# Patient Record
Sex: MALE | Race: BLACK OR AFRICAN AMERICAN | NOT HISPANIC OR LATINO | Employment: OTHER | URBAN - METROPOLITAN AREA
[De-identification: names, ages, dates, MRNs, and addresses within clinical notes are randomized per-mention and may not be internally consistent; named-entity substitution may affect disease eponyms.]

---

## 2021-11-01 ENCOUNTER — OFFICE VISIT (OUTPATIENT)
Dept: FAMILY MEDICINE | Facility: CLINIC | Age: 66
End: 2021-11-01
Payer: COMMERCIAL

## 2021-11-01 ENCOUNTER — TELEPHONE (OUTPATIENT)
Dept: FAMILY MEDICINE | Facility: CLINIC | Age: 66
End: 2021-11-01

## 2021-11-01 VITALS
OXYGEN SATURATION: 100 % | SYSTOLIC BLOOD PRESSURE: 118 MMHG | HEART RATE: 78 BPM | HEIGHT: 69 IN | BODY MASS INDEX: 21.92 KG/M2 | DIASTOLIC BLOOD PRESSURE: 76 MMHG | TEMPERATURE: 98 F | WEIGHT: 148 LBS

## 2021-11-01 DIAGNOSIS — N40.0 BENIGN PROSTATIC HYPERPLASIA WITHOUT LOWER URINARY TRACT SYMPTOMS: ICD-10-CM

## 2021-11-01 DIAGNOSIS — I10 ESSENTIAL HYPERTENSION: ICD-10-CM

## 2021-11-01 DIAGNOSIS — Z76.89 ESTABLISHING CARE WITH NEW DOCTOR, ENCOUNTER FOR: Primary | ICD-10-CM

## 2021-11-01 DIAGNOSIS — Z12.11 SPECIAL SCREENING FOR MALIGNANT NEOPLASMS, COLON: ICD-10-CM

## 2021-11-01 DIAGNOSIS — M79.89 MASS OF SOFT TISSUE OF NECK: ICD-10-CM

## 2021-11-01 PROCEDURE — 1101F PR PT FALLS ASSESS DOC 0-1 FALLS W/OUT INJ PAST YR: ICD-10-PCS | Mod: S$GLB,,, | Performed by: PHYSICIAN ASSISTANT

## 2021-11-01 PROCEDURE — 3078F PR MOST RECENT DIASTOLIC BLOOD PRESSURE < 80 MM HG: ICD-10-PCS | Mod: S$GLB,,, | Performed by: PHYSICIAN ASSISTANT

## 2021-11-01 PROCEDURE — 3288F FALL RISK ASSESSMENT DOCD: CPT | Mod: S$GLB,,, | Performed by: PHYSICIAN ASSISTANT

## 2021-11-01 PROCEDURE — 99387 PR PREVENTIVE VISIT,NEW,65 & OVER: ICD-10-PCS | Mod: S$GLB,,, | Performed by: PHYSICIAN ASSISTANT

## 2021-11-01 PROCEDURE — 4010F PR ACE/ARB THEARPY RXD/TAKEN: ICD-10-PCS | Mod: S$GLB,,, | Performed by: PHYSICIAN ASSISTANT

## 2021-11-01 PROCEDURE — 1160F PR REVIEW ALL MEDS BY PRESCRIBER/CLIN PHARMACIST DOCUMENTED: ICD-10-PCS | Mod: S$GLB,,, | Performed by: PHYSICIAN ASSISTANT

## 2021-11-01 PROCEDURE — 3008F PR BODY MASS INDEX (BMI) DOCUMENTED: ICD-10-PCS | Mod: S$GLB,,, | Performed by: PHYSICIAN ASSISTANT

## 2021-11-01 PROCEDURE — 1159F MED LIST DOCD IN RCRD: CPT | Mod: S$GLB,,, | Performed by: PHYSICIAN ASSISTANT

## 2021-11-01 PROCEDURE — 1160F RVW MEDS BY RX/DR IN RCRD: CPT | Mod: S$GLB,,, | Performed by: PHYSICIAN ASSISTANT

## 2021-11-01 PROCEDURE — 3288F PR FALLS RISK ASSESSMENT DOCUMENTED: ICD-10-PCS | Mod: S$GLB,,, | Performed by: PHYSICIAN ASSISTANT

## 2021-11-01 PROCEDURE — 3008F BODY MASS INDEX DOCD: CPT | Mod: S$GLB,,, | Performed by: PHYSICIAN ASSISTANT

## 2021-11-01 PROCEDURE — 3074F PR MOST RECENT SYSTOLIC BLOOD PRESSURE < 130 MM HG: ICD-10-PCS | Mod: S$GLB,,, | Performed by: PHYSICIAN ASSISTANT

## 2021-11-01 PROCEDURE — 4010F ACE/ARB THERAPY RXD/TAKEN: CPT | Mod: S$GLB,,, | Performed by: PHYSICIAN ASSISTANT

## 2021-11-01 PROCEDURE — 99387 INIT PM E/M NEW PAT 65+ YRS: CPT | Mod: S$GLB,,, | Performed by: PHYSICIAN ASSISTANT

## 2021-11-01 PROCEDURE — 3074F SYST BP LT 130 MM HG: CPT | Mod: S$GLB,,, | Performed by: PHYSICIAN ASSISTANT

## 2021-11-01 PROCEDURE — 1101F PT FALLS ASSESS-DOCD LE1/YR: CPT | Mod: S$GLB,,, | Performed by: PHYSICIAN ASSISTANT

## 2021-11-01 PROCEDURE — 1159F PR MEDICATION LIST DOCUMENTED IN MEDICAL RECORD: ICD-10-PCS | Mod: S$GLB,,, | Performed by: PHYSICIAN ASSISTANT

## 2021-11-01 PROCEDURE — 3078F DIAST BP <80 MM HG: CPT | Mod: S$GLB,,, | Performed by: PHYSICIAN ASSISTANT

## 2021-11-01 RX ORDER — AMLODIPINE AND BENAZEPRIL HYDROCHLORIDE 10; 40 MG/1; MG/1
1 CAPSULE ORAL DAILY
COMMUNITY
Start: 2021-08-09 | End: 2023-07-24 | Stop reason: SDUPTHER

## 2021-11-01 RX ORDER — TAMSULOSIN HYDROCHLORIDE 0.4 MG/1
0.4 CAPSULE ORAL 2 TIMES DAILY
COMMUNITY
Start: 2021-10-27

## 2021-11-02 ENCOUNTER — HOSPITAL ENCOUNTER (OUTPATIENT)
Dept: RADIOLOGY | Facility: HOSPITAL | Age: 66
Discharge: HOME | End: 2021-11-02
Attending: PHYSICIAN ASSISTANT
Payer: COMMERCIAL

## 2021-11-02 ENCOUNTER — TELEPHONE (OUTPATIENT)
Dept: FAMILY MEDICINE | Facility: CLINIC | Age: 66
End: 2021-11-02
Payer: COMMERCIAL

## 2021-11-02 DIAGNOSIS — M79.89 MASS OF SOFT TISSUE OF NECK: ICD-10-CM

## 2021-11-02 PROCEDURE — 70360 X-RAY EXAM OF NECK: CPT | Mod: TC,PO

## 2021-11-02 PROCEDURE — 76536 US EXAM OF HEAD AND NECK: CPT | Mod: TC,PO

## 2021-11-05 ENCOUNTER — TELEPHONE (OUTPATIENT)
Dept: FAMILY MEDICINE | Facility: CLINIC | Age: 66
End: 2021-11-05
Payer: COMMERCIAL

## 2021-11-05 LAB
ALBUMIN SERPL-MCNC: 4 G/DL (ref 3.6–5.1)
ALBUMIN/CREAT UR: 274 MCG/MG CREAT
ALBUMIN/GLOB SERPL: 1.3 (CALC) (ref 1–2.5)
ALP SERPL-CCNC: 56 U/L (ref 35–144)
ALT SERPL-CCNC: 10 U/L (ref 9–46)
AST SERPL-CCNC: 17 U/L (ref 10–35)
BASOPHILS # BLD AUTO: 171 CELLS/UL (ref 0–200)
BASOPHILS NFR BLD AUTO: 1.6 %
BILIRUB SERPL-MCNC: 0.3 MG/DL (ref 0.2–1.2)
BUN SERPL-MCNC: 18 MG/DL (ref 7–25)
BUN/CREAT SERPL: 13 (CALC) (ref 6–22)
CALCIUM SERPL-MCNC: 9.6 MG/DL (ref 8.6–10.3)
CHLORIDE SERPL-SCNC: 105 MMOL/L (ref 98–110)
CHOLEST SERPL-MCNC: 224 MG/DL
CHOLEST/HDLC SERPL: 2.6 (CALC)
CO2 SERPL-SCNC: 24 MMOL/L (ref 20–32)
CREAT SERPL-MCNC: 1.42 MG/DL (ref 0.7–1.25)
CREAT UR-MCNC: 69 MG/DL (ref 20–320)
EOSINOPHIL # BLD AUTO: 995 CELLS/UL (ref 15–500)
EOSINOPHIL NFR BLD AUTO: 9.3 %
ERYTHROCYTE [DISTWIDTH] IN BLOOD BY AUTOMATED COUNT: 14.7 % (ref 11–15)
GLOBULIN SER CALC-MCNC: 3.1 G/DL (CALC) (ref 1.9–3.7)
GLUCOSE SERPL-MCNC: 97 MG/DL (ref 65–99)
HBA1C MFR BLD: 5.5 % OF TOTAL HGB
HCT VFR BLD AUTO: 38.8 % (ref 38.5–50)
HDLC SERPL-MCNC: 86 MG/DL
HGB BLD-MCNC: 13 G/DL (ref 13.2–17.1)
LDLC SERPL CALC-MCNC: 121 MG/DL (CALC)
LYMPHOCYTES # BLD AUTO: 3595 CELLS/UL (ref 850–3900)
LYMPHOCYTES NFR BLD AUTO: 33.6 %
MCH RBC QN AUTO: 30.4 PG (ref 27–33)
MCHC RBC AUTO-ENTMCNC: 33.5 G/DL (ref 32–36)
MCV RBC AUTO: 90.9 FL (ref 80–100)
MICROALBUMIN UR-MCNC: 18.9 MG/DL
MONOCYTES # BLD AUTO: 963 CELLS/UL (ref 200–950)
MONOCYTES NFR BLD AUTO: 9 %
NEUTROPHILS # BLD AUTO: 4976 CELLS/UL (ref 1500–7800)
NEUTROPHILS NFR BLD AUTO: 46.5 %
NONHDLC SERPL-MCNC: 138 MG/DL (CALC)
PLATELET # BLD AUTO: 271 THOUSAND/UL (ref 140–400)
PMV BLD REES-ECKER: 11.6 FL (ref 7.5–12.5)
POTASSIUM SERPL-SCNC: 4.5 MMOL/L (ref 3.5–5.3)
PROT SERPL-MCNC: 7.1 G/DL (ref 6.1–8.1)
PSA SERPL-MCNC: 0.98 NG/ML
RBC # BLD AUTO: 4.27 MILLION/UL (ref 4.2–5.8)
SODIUM SERPL-SCNC: 141 MMOL/L (ref 135–146)
TRIGL SERPL-MCNC: 74 MG/DL
TSH SERPL-ACNC: 1.41 MIU/L (ref 0.4–4.5)
WBC # BLD AUTO: 10.7 THOUSAND/UL (ref 3.8–10.8)

## 2022-01-25 ENCOUNTER — OFFICE VISIT (OUTPATIENT)
Dept: DERMATOLOGY | Facility: CLINIC | Age: 67
End: 2022-01-25
Payer: COMMERCIAL

## 2022-01-25 DIAGNOSIS — L72.9 CYST OF SKIN: Primary | ICD-10-CM

## 2022-01-25 DIAGNOSIS — L90.5 SCAR: ICD-10-CM

## 2022-01-25 PROCEDURE — 1160F RVW MEDS BY RX/DR IN RCRD: CPT | Mod: CPTII,S$GLB,, | Performed by: STUDENT IN AN ORGANIZED HEALTH CARE EDUCATION/TRAINING PROGRAM

## 2022-01-25 PROCEDURE — 99999 PR PBB SHADOW E&M-EST. PATIENT-LVL III: CPT | Mod: PBBFAC,,, | Performed by: STUDENT IN AN ORGANIZED HEALTH CARE EDUCATION/TRAINING PROGRAM

## 2022-01-25 PROCEDURE — 1160F PR REVIEW ALL MEDS BY PRESCRIBER/CLIN PHARMACIST DOCUMENTED: ICD-10-PCS | Mod: CPTII,S$GLB,, | Performed by: STUDENT IN AN ORGANIZED HEALTH CARE EDUCATION/TRAINING PROGRAM

## 2022-01-25 PROCEDURE — 1126F PR PAIN SEVERITY QUANTIFIED, NO PAIN PRESENT: ICD-10-PCS | Mod: CPTII,S$GLB,, | Performed by: STUDENT IN AN ORGANIZED HEALTH CARE EDUCATION/TRAINING PROGRAM

## 2022-01-25 PROCEDURE — 1101F PT FALLS ASSESS-DOCD LE1/YR: CPT | Mod: CPTII,S$GLB,, | Performed by: STUDENT IN AN ORGANIZED HEALTH CARE EDUCATION/TRAINING PROGRAM

## 2022-01-25 PROCEDURE — 3288F PR FALLS RISK ASSESSMENT DOCUMENTED: ICD-10-PCS | Mod: CPTII,S$GLB,, | Performed by: STUDENT IN AN ORGANIZED HEALTH CARE EDUCATION/TRAINING PROGRAM

## 2022-01-25 PROCEDURE — 1159F MED LIST DOCD IN RCRD: CPT | Mod: CPTII,S$GLB,, | Performed by: STUDENT IN AN ORGANIZED HEALTH CARE EDUCATION/TRAINING PROGRAM

## 2022-01-25 PROCEDURE — 99999 PR PBB SHADOW E&M-EST. PATIENT-LVL III: ICD-10-PCS | Mod: PBBFAC,,, | Performed by: STUDENT IN AN ORGANIZED HEALTH CARE EDUCATION/TRAINING PROGRAM

## 2022-01-25 PROCEDURE — 1159F PR MEDICATION LIST DOCUMENTED IN MEDICAL RECORD: ICD-10-PCS | Mod: CPTII,S$GLB,, | Performed by: STUDENT IN AN ORGANIZED HEALTH CARE EDUCATION/TRAINING PROGRAM

## 2022-01-25 PROCEDURE — 3288F FALL RISK ASSESSMENT DOCD: CPT | Mod: CPTII,S$GLB,, | Performed by: STUDENT IN AN ORGANIZED HEALTH CARE EDUCATION/TRAINING PROGRAM

## 2022-01-25 PROCEDURE — 1101F PR PT FALLS ASSESS DOC 0-1 FALLS W/OUT INJ PAST YR: ICD-10-PCS | Mod: CPTII,S$GLB,, | Performed by: STUDENT IN AN ORGANIZED HEALTH CARE EDUCATION/TRAINING PROGRAM

## 2022-01-25 PROCEDURE — 1126F AMNT PAIN NOTED NONE PRSNT: CPT | Mod: CPTII,S$GLB,, | Performed by: STUDENT IN AN ORGANIZED HEALTH CARE EDUCATION/TRAINING PROGRAM

## 2022-01-25 PROCEDURE — 99202 PR OFFICE/OUTPT VISIT, NEW, LEVL II, 15-29 MIN: ICD-10-PCS | Mod: S$GLB,,, | Performed by: STUDENT IN AN ORGANIZED HEALTH CARE EDUCATION/TRAINING PROGRAM

## 2022-01-25 PROCEDURE — 99202 OFFICE O/P NEW SF 15 MIN: CPT | Mod: S$GLB,,, | Performed by: STUDENT IN AN ORGANIZED HEALTH CARE EDUCATION/TRAINING PROGRAM

## 2022-01-25 NOTE — PROGRESS NOTES
Subjective:       Patient ID:  Koffi Merritt is a 66 y.o. male who presents for   Chief Complaint   Patient presents with    Cyst     Neck, left chest     New patient    Patient here today for cysts on back of the neck and left chest  Present since November  Patient states they are not painful but there is some drainage  Patient drained cyst on back of the neck, used apple cider vinegar to start the draining then squeezed the rest out      Review of Systems   Constitutional: Negative for fever, chills and fatigue.   Respiratory: Negative for cough and shortness of breath.    Gastrointestinal: Negative for nausea and vomiting.   Hematologic/Lymphatic: Does not bruise/bleed easily.        Objective:    Physical Exam   Constitutional: He appears well-developed and well-nourished.   Neurological: He is alert and oriented to person, place, and time.   Psychiatric: He has a normal mood and affect.   Skin:   Areas Examined (abnormalities noted in diagram):   Chest / Axilla Inspection Performed              Diagram Legend     Erythematous scaling macule/papule c/w actinic keratosis       Vascular papule c/w angioma      Pigmented verrucoid papule/plaque c/w seborrheic keratosis      Yellow umbilicated papule c/w sebaceous hyperplasia      Irregularly shaped tan macule c/w lentigo     1-2 mm smooth white papules consistent with Milia      Movable subcutaneous cyst with punctum c/w epidermal inclusion cyst      Subcutaneous movable cyst c/w pilar cyst      Firm pink to brown papule c/w dermatofibroma      Pedunculated fleshy papule(s) c/w skin tag(s)      Evenly pigmented macule c/w junctional nevus     Mildly variegated pigmented, slightly irregular-bordered macule c/w mildly atypical nevus      Flesh colored to evenly pigmented papule c/w intradermal nevus       Pink pearly papule/plaque c/w basal cell carcinoma      Erythematous hyperkeratotic cursted plaque c/w SCC      Surgical scar with no sign of skin cancer recurrence       Open and closed comedones      Inflammatory papules and pustules      Verrucoid papule consistent consistent with wart     Erythematous eczematous patches and plaques     Dystrophic onycholytic nail with subungual debris c/w onychomycosis     Umbilicated papule    Erythematous-base heme-crusted tan verrucoid plaque consistent with inflamed seborrheic keratosis     Erythematous Silvery Scaling Plaque c/w Psoriasis     See annotation      Assessment / Plan:        Cyst of skin  X 2 on chest  -Reassurance given to patient. No treatment is necessary.   Discussed treatment options - excision vs observation. Cysts may recur with excision. Pt will defer treatment at this time.       Scar  - secondary to ruptured cyst, discussed risk of recurrence although appears well healed.            No follow-ups on file.

## 2022-05-02 ENCOUNTER — OFFICE VISIT (OUTPATIENT)
Dept: FAMILY MEDICINE | Facility: CLINIC | Age: 67
End: 2022-05-02
Payer: COMMERCIAL

## 2022-05-02 VITALS
WEIGHT: 149.31 LBS | HEIGHT: 69 IN | OXYGEN SATURATION: 97 % | DIASTOLIC BLOOD PRESSURE: 72 MMHG | HEART RATE: 86 BPM | TEMPERATURE: 99 F | SYSTOLIC BLOOD PRESSURE: 136 MMHG | BODY MASS INDEX: 22.12 KG/M2

## 2022-05-02 DIAGNOSIS — N52.9 ERECTILE DYSFUNCTION, UNSPECIFIED ERECTILE DYSFUNCTION TYPE: ICD-10-CM

## 2022-05-02 DIAGNOSIS — Z12.11 SPECIAL SCREENING FOR MALIGNANT NEOPLASMS, COLON: ICD-10-CM

## 2022-05-02 DIAGNOSIS — Z13.6 SCREENING FOR AAA (ABDOMINAL AORTIC ANEURYSM): ICD-10-CM

## 2022-05-02 DIAGNOSIS — E78.2 MIXED HYPERLIPIDEMIA: ICD-10-CM

## 2022-05-02 DIAGNOSIS — I10 ESSENTIAL HYPERTENSION: Primary | ICD-10-CM

## 2022-05-02 PROCEDURE — 1101F PR PT FALLS ASSESS DOC 0-1 FALLS W/OUT INJ PAST YR: ICD-10-PCS | Mod: S$GLB,,, | Performed by: PHYSICIAN ASSISTANT

## 2022-05-02 PROCEDURE — 3078F PR MOST RECENT DIASTOLIC BLOOD PRESSURE < 80 MM HG: ICD-10-PCS | Mod: S$GLB,,, | Performed by: PHYSICIAN ASSISTANT

## 2022-05-02 PROCEDURE — 3075F PR MOST RECENT SYSTOLIC BLOOD PRESS GE 130-139MM HG: ICD-10-PCS | Mod: S$GLB,,, | Performed by: PHYSICIAN ASSISTANT

## 2022-05-02 PROCEDURE — 4010F ACE/ARB THERAPY RXD/TAKEN: CPT | Mod: S$GLB,,, | Performed by: PHYSICIAN ASSISTANT

## 2022-05-02 PROCEDURE — 99214 OFFICE O/P EST MOD 30 MIN: CPT | Mod: S$GLB,,, | Performed by: PHYSICIAN ASSISTANT

## 2022-05-02 PROCEDURE — 4010F PR ACE/ARB THEARPY RXD/TAKEN: ICD-10-PCS | Mod: S$GLB,,, | Performed by: PHYSICIAN ASSISTANT

## 2022-05-02 PROCEDURE — 3008F PR BODY MASS INDEX (BMI) DOCUMENTED: ICD-10-PCS | Mod: S$GLB,,, | Performed by: PHYSICIAN ASSISTANT

## 2022-05-02 PROCEDURE — 3008F BODY MASS INDEX DOCD: CPT | Mod: S$GLB,,, | Performed by: PHYSICIAN ASSISTANT

## 2022-05-02 PROCEDURE — 3078F DIAST BP <80 MM HG: CPT | Mod: S$GLB,,, | Performed by: PHYSICIAN ASSISTANT

## 2022-05-02 PROCEDURE — 3288F FALL RISK ASSESSMENT DOCD: CPT | Mod: S$GLB,,, | Performed by: PHYSICIAN ASSISTANT

## 2022-05-02 PROCEDURE — 3075F SYST BP GE 130 - 139MM HG: CPT | Mod: S$GLB,,, | Performed by: PHYSICIAN ASSISTANT

## 2022-05-02 PROCEDURE — 3288F PR FALLS RISK ASSESSMENT DOCUMENTED: ICD-10-PCS | Mod: S$GLB,,, | Performed by: PHYSICIAN ASSISTANT

## 2022-05-02 PROCEDURE — 1159F MED LIST DOCD IN RCRD: CPT | Mod: S$GLB,,, | Performed by: PHYSICIAN ASSISTANT

## 2022-05-02 PROCEDURE — 1159F PR MEDICATION LIST DOCUMENTED IN MEDICAL RECORD: ICD-10-PCS | Mod: S$GLB,,, | Performed by: PHYSICIAN ASSISTANT

## 2022-05-02 PROCEDURE — 1160F RVW MEDS BY RX/DR IN RCRD: CPT | Mod: S$GLB,,, | Performed by: PHYSICIAN ASSISTANT

## 2022-05-02 PROCEDURE — 99214 PR OFFICE/OUTPT VISIT, EST, LEVL IV, 30-39 MIN: ICD-10-PCS | Mod: S$GLB,,, | Performed by: PHYSICIAN ASSISTANT

## 2022-05-02 PROCEDURE — 1101F PT FALLS ASSESS-DOCD LE1/YR: CPT | Mod: S$GLB,,, | Performed by: PHYSICIAN ASSISTANT

## 2022-05-02 PROCEDURE — 1160F PR REVIEW ALL MEDS BY PRESCRIBER/CLIN PHARMACIST DOCUMENTED: ICD-10-PCS | Mod: S$GLB,,, | Performed by: PHYSICIAN ASSISTANT

## 2022-05-02 RX ORDER — SILDENAFIL 25 MG/1
25 TABLET, FILM COATED ORAL DAILY PRN
Qty: 30 TABLET | Refills: 2 | Status: SHIPPED | OUTPATIENT
Start: 2022-05-02 | End: 2022-11-02

## 2022-05-02 RX ORDER — ATORVASTATIN CALCIUM 10 MG/1
10 TABLET, FILM COATED ORAL DAILY
Qty: 90 TABLET | Refills: 1 | Status: SHIPPED | OUTPATIENT
Start: 2022-05-02 | End: 2022-11-02 | Stop reason: SDUPTHER

## 2022-05-02 NOTE — PROGRESS NOTES
SUBJECTIVE:    Patient ID: Koffi Merritt is a 67 y.o. male.    Chief Complaint: Hypertension (No bottles.//Pt is here for a blood pressure check up.//Pt states colonoscopy done with Dr. Rivas this year. Will get note from colonoscopy.//ELVA)    Pt is a 67-year-old male w/ a PMHx of HTN (amlodipine-benazepril 10-40mg) and BPH (Flomax 0.4mg) who presents today for a 6 month follow up. Overall, he reports felling well today. His remains compliant with his BP regimen and is BP at home is well controlled, averaging 120-130's/60-70's mmHg.  He follows a low-sodium diet, averaging 3 meals/day which consists mostly of lean meats like fish and chicken.  He avoids red meat.  As for exercise, he walks for 20-30 minutes 2-3x/week.  He denies experiencing any CP, palpitations, SOB, or syncopal episodes.    Today, he reports having difficulty obtaining an erection and is requesting Viagra.    He is a current everyday smoker - smoking a 1/2 ppd for > 21 years.  He is not interested in quitting at this time, but is not on statin therapy.  He has never had a AAA ultrasound completed.    On last visit, he was referred to Dr. Rivas and reports having a C-scope completed. This will be requested today.     He has received 3 doses of the Pfizer COVID-19 vaccine and is going to receive the booster in the future.    He has not received the pneumococcal or shingles vaccine at this time.      No visits with results within 6 Month(s) from this visit.   Latest known visit with results is:   Office Visit on 11/01/2021   Component Date Value Ref Range Status    WBC 11/04/2021 10.7  3.8 - 10.8 Thousand/uL Final    RBC 11/04/2021 4.27  4.20 - 5.80 Million/uL Final    Hemoglobin 11/04/2021 13.0 (A) 13.2 - 17.1 g/dL Final    Hematocrit 11/04/2021 38.8  38.5 - 50.0 % Final    MCV 11/04/2021 90.9  80.0 - 100.0 fL Final    MCH 11/04/2021 30.4  27.0 - 33.0 pg Final    MCHC 11/04/2021 33.5  32.0 - 36.0 g/dL Final    RDW 11/04/2021 14.7  11.0 -  15.0 % Final    Platelets 11/04/2021 271  140 - 400 Thousand/uL Final    MPV 11/04/2021 11.6  7.5 - 12.5 fL Final    Neutrophils, Abs 11/04/2021 4,976  1,500 - 7,800 cells/uL Final    Lymph # 11/04/2021 3,595  850 - 3,900 cells/uL Final    Mono # 11/04/2021 963 (A) 200 - 950 cells/uL Final    Eos # 11/04/2021 995 (A) 15 - 500 cells/uL Final    Baso # 11/04/2021 171  0 - 200 cells/uL Final    Neutrophils Relative 11/04/2021 46.5  % Final    Lymph % 11/04/2021 33.6  % Final    Mono % 11/04/2021 9.0  % Final    Eosinophil % 11/04/2021 9.3  % Final    Basophil % 11/04/2021 1.6  % Final    Glucose 11/04/2021 97  65 - 99 mg/dL Final    BUN 11/04/2021 18  7 - 25 mg/dL Final    Creatinine 11/04/2021 1.42 (A) 0.70 - 1.25 mg/dL Final    eGFR if non African American 11/04/2021 50 (A) > OR = 60 mL/min/1.73m2 Final    eGFR if  11/04/2021 58 (A) > OR = 60 mL/min/1.73m2 Final    BUN/Creatinine Ratio 11/04/2021 13  6 - 22 (calc) Final    Sodium 11/04/2021 141  135 - 146 mmol/L Final    Potassium 11/04/2021 4.5  3.5 - 5.3 mmol/L Final    Chloride 11/04/2021 105  98 - 110 mmol/L Final    CO2 11/04/2021 24  20 - 32 mmol/L Final    Calcium 11/04/2021 9.6  8.6 - 10.3 mg/dL Final    Total Protein 11/04/2021 7.1  6.1 - 8.1 g/dL Final    Albumin 11/04/2021 4.0  3.6 - 5.1 g/dL Final    Globulin, Total 11/04/2021 3.1  1.9 - 3.7 g/dL (calc) Final    Albumin/Globulin Ratio 11/04/2021 1.3  1.0 - 2.5 (calc) Final    Total Bilirubin 11/04/2021 0.3  0.2 - 1.2 mg/dL Final    Alkaline Phosphatase 11/04/2021 56  35 - 144 U/L Final    AST 11/04/2021 17  10 - 35 U/L Final    ALT 11/04/2021 10  9 - 46 U/L Final    Hemoglobin A1C 11/04/2021 5.5  <5.7 % of total Hgb Final    Creatinine, Urine 11/04/2021 69  20 - 320 mg/dL Final    Microalb, Ur 11/04/2021 18.9  See Note: mg/dL Final    Microalb/Creat Ratio 11/04/2021 274 (A) <30 mcg/mg creat Final    TSH w/reflex to FT4 11/04/2021 1.41  0.40 - 4.50 mIU/L  Final    PROSTATE SPECIFIC ANTIGEN, SCR - Q* 2021 0.98  < OR = 4.00 ng/mL Final    Cholesterol 2021 224 (A) <200 mg/dL Final    HDL 2021 86  > OR = 40 mg/dL Final    Triglycerides 2021 74  <150 mg/dL Final    LDL Cholesterol 2021 121 (A) mg/dL (calc) Final    HDL/Cholesterol Ratio 2021 2.6  <5.0 (calc) Final    Non HDL Chol. (LDL+VLDL) 2021 138 (A) <130 mg/dL (calc) Final       Past Medical History:   Diagnosis Date    Hypertension      History reviewed. No pertinent surgical history.  Family History   Problem Relation Age of Onset    Diabetes Mother     Kidney failure Mother          at 82    Heart failure Father          in the late 50's.     Coronary artery disease Brother         stent placed, DM II, alive at 70       Marital Status:   Alcohol History:  reports current alcohol use of about 3.0 standard drinks of alcohol per week.  Tobacco History:  reports that he has been smoking cigarettes. He has been smoking about 0.50 packs per day. He has never used smokeless tobacco.  Drug History:  has no history on file for drug use.    Health Maintenance Topics with due status: Not Due       Topic Last Completion Date    Lipid Panel 2021    Influenza Vaccine Not Due     Immunization History   Administered Date(s) Administered    COVID-19, MRNA, LN-S, PF (Pfizer) (Purple Cap) 2021, 2021, 2021       Review of patient's allergies indicates:  No Known Allergies    Current Outpatient Medications:     amLODIPine-benazepriL (LOTREL) 10-40 mg per capsule, Take 1 capsule by mouth once daily., Disp: , Rfl:     atorvastatin (LIPITOR) 10 MG tablet, Take 1 tablet (10 mg total) by mouth once daily., Disp: 90 tablet, Rfl: 1    sildenafiL (VIAGRA) 25 MG tablet, Take 1 tablet (25 mg total) by mouth daily as needed for Erectile Dysfunction., Disp: 30 tablet, Rfl: 2    tamsulosin (FLOMAX) 0.4 mg Cap, Take 0.4 mg by mouth 2 (two) times a  "day., Disp: , Rfl:     Review of Systems   Constitutional: Negative for activity change, chills, fatigue and fever.   HENT: Negative for congestion and sore throat.    Respiratory: Negative for cough, shortness of breath and wheezing.    Cardiovascular: Negative for chest pain, palpitations and leg swelling.   Gastrointestinal: Negative for abdominal pain, constipation, diarrhea, nausea and vomiting.   Genitourinary: Negative for difficulty urinating, dysuria, frequency, hematuria and urgency.        "ED."   Musculoskeletal: Negative for arthralgias and myalgias.   Skin: Negative for rash.   Neurological: Negative for dizziness, syncope, weakness, light-headedness and headaches.   Psychiatric/Behavioral: Negative for behavioral problems.          Objective:      Vitals:    05/02/22 1510   BP: 136/72   Pulse: 86   Temp: 98.5 °F (36.9 °C)   SpO2: 97%   Weight: 67.7 kg (149 lb 5.1 oz)   Height: 5' 9" (1.753 m)     Physical Exam  Vitals and nursing note reviewed.   Constitutional:       General: He is not in acute distress.     Appearance: Normal appearance. He is normal weight. He is not ill-appearing, toxic-appearing or diaphoretic.   HENT:      Head: Normocephalic and atraumatic.      Right Ear: External ear normal.      Left Ear: External ear normal.      Nose: Nose normal. No rhinorrhea.      Mouth/Throat:      Mouth: Mucous membranes are moist.      Pharynx: Oropharynx is clear. No oropharyngeal exudate or posterior oropharyngeal erythema.   Eyes:      General: No scleral icterus.        Right eye: No discharge.         Left eye: No discharge.      Extraocular Movements: Extraocular movements intact.      Conjunctiva/sclera: Conjunctivae normal.   Neck:      Vascular: No carotid bruit.   Cardiovascular:      Rate and Rhythm: Normal rate and regular rhythm.      Pulses: Normal pulses.      Heart sounds: Normal heart sounds. No murmur heard.    No friction rub. No gallop.   Pulmonary:      Effort: Pulmonary effort " is normal. No respiratory distress.      Breath sounds: Normal breath sounds. No stridor. No wheezing, rhonchi or rales.   Chest:      Chest wall: No tenderness.   Abdominal:      General: There is no distension.      Palpations: Abdomen is soft. There is no mass.      Tenderness: There is no abdominal tenderness. There is no right CVA tenderness, left CVA tenderness, guarding or rebound.   Musculoskeletal:         General: No swelling or tenderness. Normal range of motion.      Cervical back: Normal range of motion. No rigidity. Tenderness: To deep palpation of the 4 cm mass on the posterior neck.  Mass is firm, fixed, and nonmobile.      Right lower leg: No edema.      Left lower leg: No edema.      Comments: 90 degree flexion at the waist.  5/5 strength appreciated of the bilateral upper and lower extremities against resistance.   Lymphadenopathy:      Cervical: No cervical adenopathy.   Skin:     General: Skin is warm and dry.      Findings: No lesion.   Neurological:      Mental Status: He is alert. Mental status is at baseline.      Cranial Nerves: No cranial nerve deficit.      Gait: Gait normal.   Psychiatric:         Mood and Affect: Mood normal.         Behavior: Behavior normal. Behavior is cooperative.           Assessment:       1. Essential hypertension    2. Mixed hyperlipidemia    3. Screening for AAA (abdominal aortic aneurysm)    4. Erectile dysfunction, unspecified erectile dysfunction type    5. Special screening for malignant neoplasms, colon           Plan:       Essential hypertension  Currently stable and well controlled.  Continue current medication regimen as is. No refills needed today.  -     CBC Auto Differential; Future; Expected date: 05/02/2022    Mixed hyperlipidemia  Tot Chol: 224, LDL: 121, HDL: 86, [place] risk score:  12.8%  Start Lipitor 10 mg q.d. and repeat CMP and lipid panel in 3 months.  -     Comprehensive Metabolic Panel; Future; Expected date: 05/02/2022  -     Lipid  Panel; Future; Expected date: 05/02/2022  -     atorvastatin (LIPITOR) 10 MG tablet; Take 1 tablet (10 mg total) by mouth once daily.  Dispense: 90 tablet; Refill: 1    Screening for AAA (abdominal aortic aneurysm)  AAA U/S ordered today   -      Abdominal Aorta; Future; Expected date: 05/02/2022    Erectile dysfunction, unspecified erectile dysfunction type  Start p.r.n. Viagra 25mg, 1/2 tablet   -     sildenafiL (VIAGRA) 25 MG tablet; Take 1 tablet (25 mg total) by mouth daily as needed for Erectile Dysfunction.  Dispense: 30 tablet; Refill: 2    Special screening for malignant neoplasms, colon  UTD. Screening colonoscopy report will be requested from Dr. Rivas today.    Patient was offered, but declined Prevnar 20 and Shingles vaccine today.    Follow up in about 6 months (around 11/2/2022) for HLP, HTN, With labs prior to visit.        5/2/2022 Joe Vinson PA-C

## 2022-05-12 ENCOUNTER — HOSPITAL ENCOUNTER (OUTPATIENT)
Dept: RADIOLOGY | Facility: HOSPITAL | Age: 67
Discharge: HOME | End: 2022-05-12
Attending: PHYSICIAN ASSISTANT
Payer: COMMERCIAL

## 2022-05-12 DIAGNOSIS — Z13.6 SCREENING FOR AAA (ABDOMINAL AORTIC ANEURYSM): ICD-10-CM

## 2022-05-12 PROCEDURE — 76706 US ABDL AORTA SCREEN AAA: CPT | Mod: TC,PO

## 2022-05-13 ENCOUNTER — TELEPHONE (OUTPATIENT)
Dept: FAMILY MEDICINE | Facility: CLINIC | Age: 67
End: 2022-05-13

## 2022-05-13 NOTE — TELEPHONE ENCOUNTER
----- Message from BRITTANY Giordano sent at 5/13/2022  6:55 AM CDT -----  Please contact patient and inform him that his abdominal ultrasound is NEGATIVE for any abdominal aortic aneurysms.

## 2022-08-04 ENCOUNTER — TELEPHONE (OUTPATIENT)
Dept: FAMILY MEDICINE | Facility: CLINIC | Age: 67
End: 2022-08-04

## 2022-08-06 ENCOUNTER — PATIENT MESSAGE (OUTPATIENT)
Dept: FAMILY MEDICINE | Facility: CLINIC | Age: 67
End: 2022-08-06

## 2022-11-02 ENCOUNTER — OFFICE VISIT (OUTPATIENT)
Dept: FAMILY MEDICINE | Facility: CLINIC | Age: 67
End: 2022-11-02
Payer: COMMERCIAL

## 2022-11-02 VITALS
DIASTOLIC BLOOD PRESSURE: 60 MMHG | HEART RATE: 76 BPM | SYSTOLIC BLOOD PRESSURE: 116 MMHG | TEMPERATURE: 98 F | BODY MASS INDEX: 21.62 KG/M2 | HEIGHT: 69 IN | WEIGHT: 146 LBS

## 2022-11-02 DIAGNOSIS — Z23 NEED FOR PNEUMOCOCCAL VACCINE: ICD-10-CM

## 2022-11-02 DIAGNOSIS — N52.9 ERECTILE DYSFUNCTION, UNSPECIFIED ERECTILE DYSFUNCTION TYPE: ICD-10-CM

## 2022-11-02 DIAGNOSIS — I10 ESSENTIAL HYPERTENSION: Primary | ICD-10-CM

## 2022-11-02 DIAGNOSIS — E78.2 MIXED HYPERLIPIDEMIA: ICD-10-CM

## 2022-11-02 DIAGNOSIS — Z23 NEED FOR INFLUENZA VACCINATION: ICD-10-CM

## 2022-11-02 DIAGNOSIS — F17.210 CIGARETTE SMOKER: ICD-10-CM

## 2022-11-02 DIAGNOSIS — Z12.5 PROSTATE CANCER SCREENING: ICD-10-CM

## 2022-11-02 DIAGNOSIS — M47.812 CERVICAL SPONDYLOSIS: ICD-10-CM

## 2022-11-02 PROCEDURE — 3008F BODY MASS INDEX DOCD: CPT | Mod: CPTII,S$GLB,, | Performed by: PHYSICIAN ASSISTANT

## 2022-11-02 PROCEDURE — 1159F PR MEDICATION LIST DOCUMENTED IN MEDICAL RECORD: ICD-10-PCS | Mod: CPTII,S$GLB,, | Performed by: PHYSICIAN ASSISTANT

## 2022-11-02 PROCEDURE — 3078F PR MOST RECENT DIASTOLIC BLOOD PRESSURE < 80 MM HG: ICD-10-PCS | Mod: CPTII,S$GLB,, | Performed by: PHYSICIAN ASSISTANT

## 2022-11-02 PROCEDURE — 1159F MED LIST DOCD IN RCRD: CPT | Mod: CPTII,S$GLB,, | Performed by: PHYSICIAN ASSISTANT

## 2022-11-02 PROCEDURE — 99214 OFFICE O/P EST MOD 30 MIN: CPT | Mod: S$GLB,,, | Performed by: PHYSICIAN ASSISTANT

## 2022-11-02 PROCEDURE — 3074F SYST BP LT 130 MM HG: CPT | Mod: CPTII,S$GLB,, | Performed by: PHYSICIAN ASSISTANT

## 2022-11-02 PROCEDURE — 1160F RVW MEDS BY RX/DR IN RCRD: CPT | Mod: CPTII,S$GLB,, | Performed by: PHYSICIAN ASSISTANT

## 2022-11-02 PROCEDURE — 99214 PR OFFICE/OUTPT VISIT, EST, LEVL IV, 30-39 MIN: ICD-10-PCS | Mod: S$GLB,,, | Performed by: PHYSICIAN ASSISTANT

## 2022-11-02 PROCEDURE — 1160F PR REVIEW ALL MEDS BY PRESCRIBER/CLIN PHARMACIST DOCUMENTED: ICD-10-PCS | Mod: CPTII,S$GLB,, | Performed by: PHYSICIAN ASSISTANT

## 2022-11-02 PROCEDURE — 4010F PR ACE/ARB THEARPY RXD/TAKEN: ICD-10-PCS | Mod: CPTII,S$GLB,, | Performed by: PHYSICIAN ASSISTANT

## 2022-11-02 PROCEDURE — 3074F PR MOST RECENT SYSTOLIC BLOOD PRESSURE < 130 MM HG: ICD-10-PCS | Mod: CPTII,S$GLB,, | Performed by: PHYSICIAN ASSISTANT

## 2022-11-02 PROCEDURE — 3008F PR BODY MASS INDEX (BMI) DOCUMENTED: ICD-10-PCS | Mod: CPTII,S$GLB,, | Performed by: PHYSICIAN ASSISTANT

## 2022-11-02 PROCEDURE — 4010F ACE/ARB THERAPY RXD/TAKEN: CPT | Mod: CPTII,S$GLB,, | Performed by: PHYSICIAN ASSISTANT

## 2022-11-02 PROCEDURE — 3078F DIAST BP <80 MM HG: CPT | Mod: CPTII,S$GLB,, | Performed by: PHYSICIAN ASSISTANT

## 2022-11-02 RX ORDER — ATORVASTATIN CALCIUM 10 MG/1
10 TABLET, FILM COATED ORAL DAILY
Qty: 90 TABLET | Refills: 1 | Status: SHIPPED | OUTPATIENT
Start: 2022-11-02 | End: 2023-05-03 | Stop reason: SDUPTHER

## 2022-11-02 NOTE — PROGRESS NOTES
SUBJECTIVE:    Patient ID: Koffi Merritt is a 67 y.o. male.    Chief Complaint: Hypertension (Brought bottles, Flu and PNA declined// SW)    Pt is a 67 y.o. male w/ a PMHx of HTN (Amlodipine-Benazepril 10-40mg) and BPH (Flomax 0.8mg) who presents today for a 6 month follow up.  Today, he reports left-sided cervical neck pain that started after sleeping in an uncomfortable position.  The pain is intermittently present and not limiting his ADLs.  He denies any radicular symptoms.  He has not taken any OTC measures at this time to alleviate the pain.    Regarding his history of HTN, he remains compliant with his antihypertensive regimen.  He does not check his BP at home, so no log provided today.  BP is stable and well controlled in office.  He denies any CP, palpitations, frequent headaches, dizziness, lightheadedness, or syncopal episodes.  As for his BPH, he is experiencing nocturia 2x/night and is compliant with his Flomax 0.8mg daily.  Last PSA (11/04/2021): 0.98.    He is a current everyday smoker - smoking a 1/2 ppd for > 21 years.  He is not interested in quitting at this time, despite counseling on multiple office visits.  UTD:  AAA ultrasound screening (05/12/2022) - negative.    UTD: Colonoscopy (01/11/2022) -  Dr. Rivas - RTC 5 years.    Due for influenza and pneumococcal vaccine.    Due for updated lab work.      No visits with results within 6 Month(s) from this visit.   Latest known visit with results is:   Office Visit on 11/01/2021   Component Date Value Ref Range Status    WBC 11/04/2021 10.7  3.8 - 10.8 Thousand/uL Final    RBC 11/04/2021 4.27  4.20 - 5.80 Million/uL Final    Hemoglobin 11/04/2021 13.0 (L)  13.2 - 17.1 g/dL Final    Hematocrit 11/04/2021 38.8  38.5 - 50.0 % Final    MCV 11/04/2021 90.9  80.0 - 100.0 fL Final    MCH 11/04/2021 30.4  27.0 - 33.0 pg Final    MCHC 11/04/2021 33.5  32.0 - 36.0 g/dL Final    RDW 11/04/2021 14.7  11.0 - 15.0 % Final    Platelets 11/04/2021 271  140 - 400  Thousand/uL Final    MPV 11/04/2021 11.6  7.5 - 12.5 fL Final    Neutrophils, Abs 11/04/2021 4,976  1,500 - 7,800 cells/uL Final    Lymph # 11/04/2021 3,595  850 - 3,900 cells/uL Final    Mono # 11/04/2021 963 (H)  200 - 950 cells/uL Final    Eos # 11/04/2021 995 (H)  15 - 500 cells/uL Final    Baso # 11/04/2021 171  0 - 200 cells/uL Final    Neutrophils Relative 11/04/2021 46.5  % Final    Lymph % 11/04/2021 33.6  % Final    Mono % 11/04/2021 9.0  % Final    Eosinophil % 11/04/2021 9.3  % Final    Basophil % 11/04/2021 1.6  % Final    Glucose 11/04/2021 97  65 - 99 mg/dL Final    BUN 11/04/2021 18  7 - 25 mg/dL Final    Creatinine 11/04/2021 1.42 (H)  0.70 - 1.25 mg/dL Final    eGFR if non African American 11/04/2021 50 (L)  > OR = 60 mL/min/1.73m2 Final    eGFR if African American 11/04/2021 58 (L)  > OR = 60 mL/min/1.73m2 Final    BUN/Creatinine Ratio 11/04/2021 13  6 - 22 (calc) Final    Sodium 11/04/2021 141  135 - 146 mmol/L Final    Potassium 11/04/2021 4.5  3.5 - 5.3 mmol/L Final    Chloride 11/04/2021 105  98 - 110 mmol/L Final    CO2 11/04/2021 24  20 - 32 mmol/L Final    Calcium 11/04/2021 9.6  8.6 - 10.3 mg/dL Final    Total Protein 11/04/2021 7.1  6.1 - 8.1 g/dL Final    Albumin 11/04/2021 4.0  3.6 - 5.1 g/dL Final    Globulin, Total 11/04/2021 3.1  1.9 - 3.7 g/dL (calc) Final    Albumin/Globulin Ratio 11/04/2021 1.3  1.0 - 2.5 (calc) Final    Total Bilirubin 11/04/2021 0.3  0.2 - 1.2 mg/dL Final    Alkaline Phosphatase 11/04/2021 56  35 - 144 U/L Final    AST 11/04/2021 17  10 - 35 U/L Final    ALT 11/04/2021 10  9 - 46 U/L Final    Hemoglobin A1C 11/04/2021 5.5  <5.7 % of total Hgb Final    Creatinine, Urine 11/04/2021 69  20 - 320 mg/dL Final    Microalb, Ur 11/04/2021 18.9  See Note: mg/dL Final    Microalb/Creat Ratio 11/04/2021 274 (H)  <30 mcg/mg creat Final    TSH w/reflex to FT4 11/04/2021 1.41  0.40 - 4.50 mIU/L Final    PROSTATE SPECIFIC ANTIGEN, SCR - Q* 11/04/2021 0.98  < OR = 4.00 ng/mL  Final    Cholesterol 2021 224 (H)  <200 mg/dL Final    HDL 2021 86  > OR = 40 mg/dL Final    Triglycerides 2021 74  <150 mg/dL Final    LDL Cholesterol 2021 121 (H)  mg/dL (calc) Final    HDL/Cholesterol Ratio 2021 2.6  <5.0 (calc) Final    Non HDL Chol. (LDL+VLDL) 2021 138 (H)  <130 mg/dL (calc) Final       Past Medical History:   Diagnosis Date    Hypertension      History reviewed. No pertinent surgical history.  Family History   Problem Relation Age of Onset    Diabetes Mother     Kidney failure Mother          at 82    Heart failure Father          in the late 50's.     Coronary artery disease Brother         stent placed, DM II, alive at 70       Marital Status:   Alcohol History:  reports current alcohol use of about 3.0 standard drinks per week.  Tobacco History:  reports that he has been smoking cigarettes. He has been smoking an average of .5 packs per day. He has never used smokeless tobacco.  Drug History:  has no history on file for drug use.    Health Maintenance Topics with due status: Not Due       Topic Last Completion Date    Lipid Panel 2021    Colorectal Cancer Screening 2022     Immunization History   Administered Date(s) Administered    COVID-19, MRNA, LN-S, PF (Pfizer) (Purple Cap) 2021, 2021, 2021       Review of patient's allergies indicates:  No Known Allergies    Current Outpatient Medications:     amLODIPine-benazepriL (LOTREL) 10-40 mg per capsule, Take 1 capsule by mouth once daily., Disp: , Rfl:     tamsulosin (FLOMAX) 0.4 mg Cap, Take 0.4 mg by mouth 2 (two) times a day., Disp: , Rfl:     atorvastatin (LIPITOR) 10 MG tablet, Take 1 tablet (10 mg total) by mouth once daily., Disp: 90 tablet, Rfl: 1    Review of Systems   Constitutional:  Negative for activity change, appetite change, chills, fatigue, fever and unexpected weight change.   HENT:  Negative for congestion, ear discharge, ear pain, postnasal  "drip, rhinorrhea, sore throat, trouble swallowing and voice change.    Respiratory:  Negative for cough, shortness of breath and wheezing.    Cardiovascular:  Negative for chest pain and palpitations.   Gastrointestinal:  Negative for abdominal pain, constipation, diarrhea, nausea and vomiting.   Genitourinary:  Negative for decreased urine volume, difficulty urinating, dysuria, frequency, hematuria and urgency.   Musculoskeletal:  Positive for neck stiffness. Negative for arthralgias, gait problem and myalgias.   Skin:  Negative for rash.   Neurological:  Negative for dizziness, syncope, weakness, light-headedness, numbness and headaches.   Psychiatric/Behavioral:  Negative for behavioral problems.         Objective:      Vitals:    11/02/22 1452   BP: 116/60   Pulse: 76   Temp: 98.1 °F (36.7 °C)   Weight: 66.2 kg (146 lb)   Height: 5' 9" (1.753 m)     Physical Exam  Vitals and nursing note reviewed.   Constitutional:       General: He is not in acute distress.     Appearance: Normal appearance. He is normal weight. He is not ill-appearing, toxic-appearing or diaphoretic.   HENT:      Head: Normocephalic and atraumatic.      Right Ear: External ear normal.      Left Ear: External ear normal.      Nose: Nose normal. No rhinorrhea.      Mouth/Throat:      Mouth: Mucous membranes are moist.      Pharynx: Oropharynx is clear. No posterior oropharyngeal erythema.   Eyes:      General: No scleral icterus.        Right eye: No discharge.         Left eye: No discharge.      Extraocular Movements: Extraocular movements intact.      Conjunctiva/sclera: Conjunctivae normal.   Neck:      Vascular: No carotid bruit.      Comments: Cervical spine has full ROM on flexion, extension, bilateral rotation, and bilateral lateral bend.  5/5 strength against resistance on flexion, extension, bilateral rotation, and bilateral lateral bend.  No masses or bony abnormalities noted upon palpation of the cervical spine.  No step-offs noted " upon palpation of the cervical spine.    Cardiovascular:      Rate and Rhythm: Normal rate and regular rhythm.      Pulses: Normal pulses.      Heart sounds: Normal heart sounds. No murmur heard.    No friction rub.   Pulmonary:      Effort: Pulmonary effort is normal. No respiratory distress.      Breath sounds: Normal breath sounds. No wheezing, rhonchi or rales.   Abdominal:      General: There is no distension.      Palpations: Abdomen is soft.      Tenderness: There is no abdominal tenderness. There is no guarding or rebound.   Musculoskeletal:         General: No swelling or tenderness. Normal range of motion.      Cervical back: Normal range of motion. No swelling, deformity, rigidity, spasms, tenderness or bony tenderness. No pain with movement. Normal range of motion.      Right lower leg: No edema.      Left lower leg: No edema.      Comments: 90 degree flexion at the waist.  5/5 strength appreciated of the bilateral upper and lower extremities against resistance.   Lymphadenopathy:      Cervical: No cervical adenopathy.   Skin:     General: Skin is warm and dry.      Coloration: Skin is not jaundiced.      Findings: No lesion or rash.   Neurological:      Mental Status: He is alert. Mental status is at baseline.      Cranial Nerves: No cranial nerve deficit.      Gait: Gait normal.   Psychiatric:         Mood and Affect: Mood normal.         Behavior: Behavior normal. Behavior is cooperative.         Assessment:       1. Essential hypertension    2. Mixed hyperlipidemia    3. Erectile dysfunction, unspecified erectile dysfunction type    4. Prostate cancer screening    5. Cervical spondylosis    6. Need for influenza vaccination    7. Need for pneumococcal vaccine    8. Cigarette smoker           Plan:       Essential hypertension  Stable and well controlled.   Stop smoking and follow a low-sodium diet.  Continue current antihypertensive treatment regimen as is - no refills requested today.  -     CBC  Auto Differential; Future; Expected date: 11/02/2022  -     Microalbumin/Creatinine Ratio, Urine; Future  -     TSH w/reflex to FT4; Future; Expected date: 11/02/2022  -     Urinalysis, Reflex to Urine Culture Urine, Clean Catch; Future; Expected date: 11/02/2022    Mixed hyperlipidemia  Due for updated lipid panel and CMP - ordered today and to be completed when patient is fasting.  Continue Lipitor 10 mg q.d.. - refill sent today  -     atorvastatin (LIPITOR) 10 MG tablet; Take 1 tablet (10 mg total) by mouth once daily.  Dispense: 90 tablet; Refill: 1  -     Comprehensive Metabolic Panel; Future; Expected date: 11/02/2022  -     Lipid Panel; Future; Expected date: 11/02/2022    Prostate cancer screening  -     PSA, Screening; Future; Expected date: 11/02/2022    Cervical spondylosis  Initiate RICE therapy.   Start OTC Voltaren 1% gel b.i.d. and p.r.n. OTC Tylenol extra-strength for pain relief.  Begin home neck stretching/strengthening exercises.  If pain persists, contact the office and will consider starting Mobic and referring for physical therapy.    Need for influenza vaccination  Offered influenza vaccine today, but patient declined.    Need for pneumococcal vaccine  Offered Prevnar 20 vaccine to patient today, but patient declined.    Cigarette smoker  Smoking cessation discussed with patient today in office, but he is not interested in quitting at this time.  Will discuss need for LDCT scan on follow up visit.     Annual lab work ordered today and to be completed when patient is fasting.    Follow up for HTN, HLP, With labs prior to visit.        11/2/2022 Joe Vinson PA-C

## 2023-01-10 ENCOUNTER — TELEPHONE (OUTPATIENT)
Dept: FAMILY MEDICINE | Facility: CLINIC | Age: 68
End: 2023-01-10

## 2023-01-10 NOTE — TELEPHONE ENCOUNTER
----- Message from Tameka Irby MA sent at 1/10/2023 11:39 AM CST -----    ----- Message -----  From: Malaika Ward  Sent: 1/10/2023  11:36 AM CST  To: [facility] Staff    Pt need a sooner appt for pain on the left side of his head and a terrible ear ache. [phone]

## 2023-01-11 ENCOUNTER — OFFICE VISIT (OUTPATIENT)
Dept: FAMILY MEDICINE | Facility: CLINIC | Age: 68
End: 2023-01-11
Payer: COMMERCIAL

## 2023-01-11 VITALS
HEART RATE: 87 BPM | SYSTOLIC BLOOD PRESSURE: 118 MMHG | TEMPERATURE: 98 F | OXYGEN SATURATION: 96 % | DIASTOLIC BLOOD PRESSURE: 64 MMHG

## 2023-01-11 DIAGNOSIS — I10 ESSENTIAL HYPERTENSION: ICD-10-CM

## 2023-01-11 DIAGNOSIS — R35.1 NOCTURIA: ICD-10-CM

## 2023-01-11 DIAGNOSIS — H66.90 ACUTE OTITIS MEDIA, UNSPECIFIED OTITIS MEDIA TYPE: Primary | ICD-10-CM

## 2023-01-11 DIAGNOSIS — E78.2 MIXED HYPERLIPIDEMIA: ICD-10-CM

## 2023-01-11 PROCEDURE — 3288F PR FALLS RISK ASSESSMENT DOCUMENTED: ICD-10-PCS | Mod: CPTII,S$GLB,, | Performed by: PHYSICIAN ASSISTANT

## 2023-01-11 PROCEDURE — 1160F RVW MEDS BY RX/DR IN RCRD: CPT | Mod: CPTII,S$GLB,, | Performed by: PHYSICIAN ASSISTANT

## 2023-01-11 PROCEDURE — 3074F PR MOST RECENT SYSTOLIC BLOOD PRESSURE < 130 MM HG: ICD-10-PCS | Mod: CPTII,S$GLB,, | Performed by: PHYSICIAN ASSISTANT

## 2023-01-11 PROCEDURE — 3078F DIAST BP <80 MM HG: CPT | Mod: CPTII,S$GLB,, | Performed by: PHYSICIAN ASSISTANT

## 2023-01-11 PROCEDURE — 3074F SYST BP LT 130 MM HG: CPT | Mod: CPTII,S$GLB,, | Performed by: PHYSICIAN ASSISTANT

## 2023-01-11 PROCEDURE — 1160F PR REVIEW ALL MEDS BY PRESCRIBER/CLIN PHARMACIST DOCUMENTED: ICD-10-PCS | Mod: CPTII,S$GLB,, | Performed by: PHYSICIAN ASSISTANT

## 2023-01-11 PROCEDURE — 3078F PR MOST RECENT DIASTOLIC BLOOD PRESSURE < 80 MM HG: ICD-10-PCS | Mod: CPTII,S$GLB,, | Performed by: PHYSICIAN ASSISTANT

## 2023-01-11 PROCEDURE — 1101F PR PT FALLS ASSESS DOC 0-1 FALLS W/OUT INJ PAST YR: ICD-10-PCS | Mod: CPTII,S$GLB,, | Performed by: PHYSICIAN ASSISTANT

## 2023-01-11 PROCEDURE — 1101F PT FALLS ASSESS-DOCD LE1/YR: CPT | Mod: CPTII,S$GLB,, | Performed by: PHYSICIAN ASSISTANT

## 2023-01-11 PROCEDURE — 99213 OFFICE O/P EST LOW 20 MIN: CPT | Mod: S$GLB,,, | Performed by: PHYSICIAN ASSISTANT

## 2023-01-11 PROCEDURE — 3288F FALL RISK ASSESSMENT DOCD: CPT | Mod: CPTII,S$GLB,, | Performed by: PHYSICIAN ASSISTANT

## 2023-01-11 PROCEDURE — 99213 PR OFFICE/OUTPT VISIT, EST, LEVL III, 20-29 MIN: ICD-10-PCS | Mod: S$GLB,,, | Performed by: PHYSICIAN ASSISTANT

## 2023-01-11 PROCEDURE — 1159F PR MEDICATION LIST DOCUMENTED IN MEDICAL RECORD: ICD-10-PCS | Mod: CPTII,S$GLB,, | Performed by: PHYSICIAN ASSISTANT

## 2023-01-11 PROCEDURE — 1159F MED LIST DOCD IN RCRD: CPT | Mod: CPTII,S$GLB,, | Performed by: PHYSICIAN ASSISTANT

## 2023-01-11 RX ORDER — AMOXICILLIN AND CLAVULANATE POTASSIUM 875; 125 MG/1; MG/1
1 TABLET, FILM COATED ORAL 2 TIMES DAILY
Qty: 14 TABLET | Refills: 0 | Status: SHIPPED | OUTPATIENT
Start: 2023-01-11 | End: 2023-01-18

## 2023-01-11 NOTE — PROGRESS NOTES
"  SUBJECTIVE:    Patient ID: Koffi Merritt is a 67 y.o. male.    Chief Complaint: Tinnitus (No bottles/ Left ear ringing with pulsing sensation and difficulty turning to the left started x 3+ years but getting worse, unable to sleep last night until took a tylenol/ declines PNA and flu vaccine/  discuss Flomax med per pt not working like it should/ mp)    Pt is a 67 y.o. male w/ a PMHx of HTN (Amlodipine-Benazepril 10-40mg) and BPH (Flomax 0.8mg) who presents today for a sick visit with a CC of "left ear pain and a pulsating sound in the left ear." He reports a pulsating sound in the left ear that has been present for > 3 years.  He is a current everyday smoker. He has a longstanding history - smokes 10 cigarettes/day for approximately 40 years.  He reports an acute onset of left ear pain that started last night.  The pain is severe enough to where laying on his left side is uncomfortable.  Accompanied with this ear pain, is a cheesy like discharge that he notes on his pillow upon wakening.  He denies any foreign bodies to the left ear, tinnitus, or fevers.      Also, today he reports nocturia 3x/night.  He is currently managed on Flomax 0.8 mg q.d. Last PSA (11/4/21) is 0.98.  He is averaging 3+ beers/night.  He denies any dysuria, hematuria, urgency, or flank pain.    No visits with results within 6 Month(s) from this visit.   Latest known visit with results is:   Office Visit on 11/01/2021   Component Date Value Ref Range Status    WBC 11/04/2021 10.7  3.8 - 10.8 Thousand/uL Final    RBC 11/04/2021 4.27  4.20 - 5.80 Million/uL Final    Hemoglobin 11/04/2021 13.0 (L)  13.2 - 17.1 g/dL Final    Hematocrit 11/04/2021 38.8  38.5 - 50.0 % Final    MCV 11/04/2021 90.9  80.0 - 100.0 fL Final    MCH 11/04/2021 30.4  27.0 - 33.0 pg Final    MCHC 11/04/2021 33.5  32.0 - 36.0 g/dL Final    RDW 11/04/2021 14.7  11.0 - 15.0 % Final    Platelets 11/04/2021 271  140 - 400 Thousand/uL Final    MPV 11/04/2021 11.6  7.5 - 12.5 fL " Final    Neutrophils, Abs 11/04/2021 4,976  1,500 - 7,800 cells/uL Final    Lymph # 11/04/2021 3,595  850 - 3,900 cells/uL Final    Mono # 11/04/2021 963 (H)  200 - 950 cells/uL Final    Eos # 11/04/2021 995 (H)  15 - 500 cells/uL Final    Baso # 11/04/2021 171  0 - 200 cells/uL Final    Neutrophils Relative 11/04/2021 46.5  % Final    Lymph % 11/04/2021 33.6  % Final    Mono % 11/04/2021 9.0  % Final    Eosinophil % 11/04/2021 9.3  % Final    Basophil % 11/04/2021 1.6  % Final    Glucose 11/04/2021 97  65 - 99 mg/dL Final    BUN 11/04/2021 18  7 - 25 mg/dL Final    Creatinine 11/04/2021 1.42 (H)  0.70 - 1.25 mg/dL Final    eGFR if non African American 11/04/2021 50 (L)  > OR = 60 mL/min/1.73m2 Final    eGFR if African American 11/04/2021 58 (L)  > OR = 60 mL/min/1.73m2 Final    BUN/Creatinine Ratio 11/04/2021 13  6 - 22 (calc) Final    Sodium 11/04/2021 141  135 - 146 mmol/L Final    Potassium 11/04/2021 4.5  3.5 - 5.3 mmol/L Final    Chloride 11/04/2021 105  98 - 110 mmol/L Final    CO2 11/04/2021 24  20 - 32 mmol/L Final    Calcium 11/04/2021 9.6  8.6 - 10.3 mg/dL Final    Total Protein 11/04/2021 7.1  6.1 - 8.1 g/dL Final    Albumin 11/04/2021 4.0  3.6 - 5.1 g/dL Final    Globulin, Total 11/04/2021 3.1  1.9 - 3.7 g/dL (calc) Final    Albumin/Globulin Ratio 11/04/2021 1.3  1.0 - 2.5 (calc) Final    Total Bilirubin 11/04/2021 0.3  0.2 - 1.2 mg/dL Final    Alkaline Phosphatase 11/04/2021 56  35 - 144 U/L Final    AST 11/04/2021 17  10 - 35 U/L Final    ALT 11/04/2021 10  9 - 46 U/L Final    Hemoglobin A1C 11/04/2021 5.5  <5.7 % of total Hgb Final    Creatinine, Urine 11/04/2021 69  20 - 320 mg/dL Final    Microalb, Ur 11/04/2021 18.9  See Note: mg/dL Final    Microalb/Creat Ratio 11/04/2021 274 (H)  <30 mcg/mg creat Final    TSH w/reflex to FT4 11/04/2021 1.41  0.40 - 4.50 mIU/L Final    PROSTATE SPECIFIC ANTIGEN, SCR - Q* 11/04/2021 0.98  < OR = 4.00 ng/mL Final    Cholesterol 11/04/2021 224 (H)  <200 mg/dL Final     HDL 2021 86  > OR = 40 mg/dL Final    Triglycerides 2021 74  <150 mg/dL Final    LDL Cholesterol 2021 121 (H)  mg/dL (calc) Final    HDL/Cholesterol Ratio 2021 2.6  <5.0 (calc) Final    Non HDL Chol. (LDL+VLDL) 2021 138 (H)  <130 mg/dL (calc) Final       Past Medical History:   Diagnosis Date    Hypertension      History reviewed. No pertinent surgical history.  Family History   Problem Relation Age of Onset    Diabetes Mother     Kidney failure Mother          at 82    Heart failure Father          in the late 50's.     Coronary artery disease Brother         stent placed, DM II, alive at 70       Marital Status:   Alcohol History:  reports current alcohol use of about 3.0 standard drinks per week.  Tobacco History:  reports that he has been smoking cigarettes. He has been smoking an average of .5 packs per day. He has never used smokeless tobacco.  Drug History:  has no history on file for drug use.    Health Maintenance Topics with due status: Not Due       Topic Last Completion Date    Lipid Panel 2021    Colorectal Cancer Screening 2022     Immunization History   Administered Date(s) Administered    COVID-19, MRNA, LN-S, PF (Pfizer) (Purple Cap) 2021, 2021, 2021       Review of patient's allergies indicates:  No Known Allergies    Current Outpatient Medications:     amLODIPine-benazepriL (LOTREL) 10-40 mg per capsule, Take 1 capsule by mouth once daily., Disp: , Rfl:     atorvastatin (LIPITOR) 10 MG tablet, Take 1 tablet (10 mg total) by mouth once daily., Disp: 90 tablet, Rfl: 1    tamsulosin (FLOMAX) 0.4 mg Cap, Take 0.4 mg by mouth 2 (two) times a day., Disp: , Rfl:     amoxicillin-clavulanate 875-125mg (AUGMENTIN) 875-125 mg per tablet, Take 1 tablet by mouth 2 (two) times daily. for 7 days, Disp: 14 tablet, Rfl: 0    Review of Systems   Constitutional:  Negative for activity change, chills, fatigue and fever.   HENT:  Positive  "for ear pain ("Left ear pain that started last night."). Negative for congestion, ear discharge, sinus pressure, sinus pain, sore throat and tinnitus.         "A constant pulsating sound in the left ear."   Respiratory:  Negative for cough, shortness of breath and wheezing.    Cardiovascular:  Negative for chest pain and palpitations.   Gastrointestinal:  Negative for abdominal pain, constipation, diarrhea, nausea and vomiting.   Genitourinary:  Positive for frequency. Negative for decreased urine volume, difficulty urinating, dysuria, enuresis, flank pain, hematuria and urgency.   Musculoskeletal:  Negative for arthralgias and myalgias.   Skin:  Negative for rash.   Neurological:  Negative for dizziness, tremors, syncope, weakness, light-headedness, numbness and headaches.        Objective:      Vitals:    01/11/23 1341   BP: 118/64   Pulse: 87   Temp: 98.3 °F (36.8 °C)   SpO2: 96%     Physical Exam  Vitals reviewed.   Constitutional:       General: He is not in acute distress.     Appearance: Normal appearance. He is normal weight. He is not ill-appearing or toxic-appearing.   HENT:      Head: Normocephalic and atraumatic.      Right Ear: Tympanic membrane, ear canal and external ear normal. No decreased hearing noted. No drainage or tenderness. No middle ear effusion. There is no impacted cerumen. No foreign body. No mastoid tenderness. Tympanic membrane is not injected, perforated, retracted or bulging.      Left Ear: Ear canal and external ear normal. Decreased hearing noted. Drainage (leukorrhea noted) present. No tenderness. A middle ear effusion is present. There is no impacted cerumen. No foreign body. No mastoid tenderness. Tympanic membrane is bulging. Tympanic membrane is not injected, perforated or retracted.      Nose: Nose normal. No rhinorrhea.      Mouth/Throat:      Mouth: Mucous membranes are moist.      Pharynx: Oropharynx is clear.   Eyes:      General: No scleral icterus.     " Conjunctiva/sclera: Conjunctivae normal.   Cardiovascular:      Rate and Rhythm: Normal rate and regular rhythm.      Pulses: Normal pulses.      Heart sounds: Normal heart sounds. No murmur heard.    No gallop.   Pulmonary:      Effort: Pulmonary effort is normal.      Breath sounds: Normal breath sounds. No wheezing, rhonchi or rales.   Abdominal:      Palpations: Abdomen is soft.      Tenderness: There is no abdominal tenderness. There is no guarding.   Musculoskeletal:      Cervical back: Normal range of motion. No rigidity or tenderness.      Right lower leg: No edema.      Left lower leg: No edema.   Lymphadenopathy:      Cervical: No cervical adenopathy.   Skin:     General: Skin is warm and dry.      Coloration: Skin is not jaundiced.      Findings: No rash.   Neurological:      General: No focal deficit present.      Mental Status: He is alert. Mental status is at baseline.      Cranial Nerves: No cranial nerve deficit.      Gait: Gait normal.   Psychiatric:         Mood and Affect: Mood normal.         Behavior: Behavior normal. Behavior is cooperative.         Assessment:       1. Acute otitis media, unspecified otitis media type    2. Nocturia    3. Essential hypertension    4. Mixed hyperlipidemia           Plan:       Acute otitis media, unspecified otitis media type  Start amoxicillin-clavulanate 875-125mg (AUGMENTIN) 875-125 mg per tablet; Take 1 tablet by mouth 2 (two) times daily. for 7 days  Dispense: 14 tablet; Refill: 0  Take OTC p.r.n. Tylenol for pain relief.    Nocturia  Last PSA: 0.98.  Suspected cause at this time is secondary to nightly alcohol intake or 3+ beers - stop drinking ETOH.   Repeat PSA ordered on last visit, but not completed. Complete lab work.     Essential hypertension  Stable and well controlled.     Mixed hyperlipidemia      Follow up if symptoms worsen or fail to improve, for left acute otitis media, nocturia.        1/11/2023 Joe Vinson PA-C

## 2023-01-13 ENCOUNTER — PATIENT MESSAGE (OUTPATIENT)
Dept: FAMILY MEDICINE | Facility: CLINIC | Age: 68
End: 2023-01-13

## 2023-01-13 LAB
ALBUMIN/CREAT UR: 351 MCG/MG CREAT
APPEARANCE UR: CLEAR
BACTERIA #/AREA URNS HPF: ABNORMAL /HPF
BACTERIA UR CULT: ABNORMAL
BILIRUB UR QL STRIP: NEGATIVE
COLOR UR: YELLOW
CREAT UR-MCNC: 85 MG/DL (ref 20–320)
GLUCOSE UR QL STRIP: NEGATIVE
HGB UR QL STRIP: NEGATIVE
HYALINE CASTS #/AREA URNS LPF: ABNORMAL /LPF
KETONES UR QL STRIP: NEGATIVE
LEUKOCYTE ESTERASE UR QL STRIP: NEGATIVE
MICROALBUMIN UR-MCNC: 29.8 MG/DL
NITRITE UR QL STRIP: NEGATIVE
PH UR STRIP: 5.5 [PH] (ref 5–8)
PROT UR QL STRIP: ABNORMAL
RBC #/AREA URNS HPF: ABNORMAL /HPF
SERVICE CMNT-IMP: ABNORMAL
SP GR UR STRIP: 1.01 (ref 1–1.03)
SQUAMOUS #/AREA URNS HPF: ABNORMAL /HPF
WBC #/AREA URNS HPF: ABNORMAL /HPF

## 2023-01-14 LAB
ALBUMIN SERPL-MCNC: 3.9 G/DL (ref 3.6–5.1)
ALBUMIN/GLOB SERPL: 1.3 (CALC) (ref 1–2.5)
ALP SERPL-CCNC: 65 U/L (ref 35–144)
ALT SERPL-CCNC: 10 U/L (ref 9–46)
AST SERPL-CCNC: 14 U/L (ref 10–35)
BASOPHILS # BLD AUTO: 162 CELLS/UL (ref 0–200)
BASOPHILS NFR BLD AUTO: 1.5 %
BILIRUB SERPL-MCNC: 0.3 MG/DL (ref 0.2–1.2)
BUN SERPL-MCNC: 21 MG/DL (ref 7–25)
BUN/CREAT SERPL: 13 (CALC) (ref 6–22)
CALCIUM SERPL-MCNC: 9.9 MG/DL (ref 8.6–10.3)
CHLORIDE SERPL-SCNC: 106 MMOL/L (ref 98–110)
CHOLEST SERPL-MCNC: 188 MG/DL
CHOLEST/HDLC SERPL: 2.6 (CALC)
CO2 SERPL-SCNC: 27 MMOL/L (ref 20–32)
CREAT SERPL-MCNC: 1.64 MG/DL (ref 0.7–1.35)
EGFR: 45 ML/MIN/1.73M2
EOSINOPHIL # BLD AUTO: 1058 CELLS/UL (ref 15–500)
EOSINOPHIL NFR BLD AUTO: 9.8 %
ERYTHROCYTE [DISTWIDTH] IN BLOOD BY AUTOMATED COUNT: 14.7 % (ref 11–15)
GLOBULIN SER CALC-MCNC: 3.1 G/DL (CALC) (ref 1.9–3.7)
GLUCOSE SERPL-MCNC: 95 MG/DL (ref 65–99)
HCT VFR BLD AUTO: 36.4 % (ref 38.5–50)
HDLC SERPL-MCNC: 72 MG/DL
HGB BLD-MCNC: 12.3 G/DL (ref 13.2–17.1)
LDLC SERPL CALC-MCNC: 97 MG/DL (CALC)
LYMPHOCYTES # BLD AUTO: 3920 CELLS/UL (ref 850–3900)
LYMPHOCYTES NFR BLD AUTO: 36.3 %
MCH RBC QN AUTO: 30.3 PG (ref 27–33)
MCHC RBC AUTO-ENTMCNC: 33.8 G/DL (ref 32–36)
MCV RBC AUTO: 89.7 FL (ref 80–100)
MONOCYTES # BLD AUTO: 1037 CELLS/UL (ref 200–950)
MONOCYTES NFR BLD AUTO: 9.6 %
NEUTROPHILS # BLD AUTO: 4622 CELLS/UL (ref 1500–7800)
NEUTROPHILS NFR BLD AUTO: 42.8 %
NONHDLC SERPL-MCNC: 116 MG/DL (CALC)
PLATELET # BLD AUTO: 285 THOUSAND/UL (ref 140–400)
PMV BLD REES-ECKER: 10.9 FL (ref 7.5–12.5)
POTASSIUM SERPL-SCNC: 5 MMOL/L (ref 3.5–5.3)
PROT SERPL-MCNC: 7 G/DL (ref 6.1–8.1)
PSA SERPL-MCNC: 0.64 NG/ML
RBC # BLD AUTO: 4.06 MILLION/UL (ref 4.2–5.8)
SODIUM SERPL-SCNC: 139 MMOL/L (ref 135–146)
TRIGL SERPL-MCNC: 96 MG/DL
TSH SERPL-ACNC: 1.75 MIU/L (ref 0.4–4.5)
WBC # BLD AUTO: 10.8 THOUSAND/UL (ref 3.8–10.8)

## 2023-01-17 DIAGNOSIS — D64.9 ANEMIA, UNSPECIFIED TYPE: ICD-10-CM

## 2023-01-17 DIAGNOSIS — R80.9 PROTEINURIA, UNSPECIFIED TYPE: Primary | ICD-10-CM

## 2023-01-18 ENCOUNTER — TELEPHONE (OUTPATIENT)
Dept: FAMILY MEDICINE | Facility: CLINIC | Age: 68
End: 2023-01-18

## 2023-01-18 NOTE — TELEPHONE ENCOUNTER
"----- Message from BRITTANY Giordano sent at 1/17/2023  5:52 PM CST -----  Please contact patient and inform him that his lab work was reviewed.  UA is negative for any signs of infection, glucose, or occult blood.  Liver and thyroid function are within normal limits.  PSA is excellent 0.64.  Cholesterol levels are well controlled and within reference range.  Of note, kidney function is slightly decreased, 2+ protein is noted in the urine, and microalbumin/creatinine ratio is elevated - increase daily fluid intake, limit protein intake to 2g per day, and avoid OTC NSAIDs. Repeat a BMP and UA in 6 weeks. Also, a mild anemia is present.  Secondary to this, I recommend completing a FOBT tests now, and repeating a CBC in 6 weeks.  Please create a "remind me" to have lab work completed then.  "

## 2023-01-18 NOTE — TELEPHONE ENCOUNTER
"Spoke with pt in regards to recent lab results. Verbalized per Joe that pt's  UA is negative for any signs of infection, glucose, or occult blood. Liver and thyroid function are within normal limits. PSA is excellent 0.64. Cholesterol levels are well controlled and within reference range. Pt's kidney function is slightly decreased, 2+ protein is noted in the urine, and microalbumin/creatinine ratio is elevated. Joe is wanting the pt to increase daily fluid intake, limit protein intake to 2g per day, and avoid OTC NSAIDs. Repeat a BMP and UA in 6 weeks. Also, a mild anemia is present. Joe recommends completing a FOBT tests now, and repeating a CBC in 6 weeks.  Please create a "remind me" to have lab work completed then. Pt acknowledge understanding.     Reminder set up.   "

## 2023-02-01 ENCOUNTER — TELEPHONE (OUTPATIENT)
Dept: FAMILY MEDICINE | Facility: CLINIC | Age: 68
End: 2023-02-01

## 2023-02-01 NOTE — TELEPHONE ENCOUNTER
----- Message from Whit Linton sent at 2/1/2023  8:04 AM CST -----  Pt states that he was suppose to come back in 6 weeks from his 1/11 appointment. Please advise. Pt #[phone]

## 2023-02-06 ENCOUNTER — TELEPHONE (OUTPATIENT)
Dept: FAMILY MEDICINE | Facility: CLINIC | Age: 68
End: 2023-02-06

## 2023-02-06 NOTE — TELEPHONE ENCOUNTER
Scheduled pt for tomorrow at 10 with [facility] .   Spoke to pt and let him know about appointment schedule pt verbalized understanding

## 2023-02-06 NOTE — TELEPHONE ENCOUNTER
----- Message from Tameka Irby MA sent at 2/6/2023 11:32 AM CST -----    ----- Message -----  From: Malaika Ward  Sent: 2/6/2023  11:25 AM CST  To: [facility] Staff    Pt returning a phone call. [phone]

## 2023-02-06 NOTE — TELEPHONE ENCOUNTER
Spoke to pt and let pt know KAVYA was contacted and Left voice message to be call for pt to be scheduled. Let pt know I will call KAVYA to schedule appointment.    Attempted to call KAVYA 3 x LMOR   Call [place] location was transfer to location on [facility] still no answer LMOR    Spoke to pt and let pt know I have LMOR for KAVYA to call back to schedule an appointment. Gave pt number to KAVYA [phone] so pt could call to see if he can get in. Pt verbalized understanding

## 2023-02-09 DIAGNOSIS — H93.A2 PULSATILE TINNITUS OF LEFT EAR: Primary | ICD-10-CM

## 2023-02-15 ENCOUNTER — TELEPHONE (OUTPATIENT)
Dept: FAMILY MEDICINE | Facility: CLINIC | Age: 68
End: 2023-02-15

## 2023-02-15 NOTE — TELEPHONE ENCOUNTER
"----- Message from Maylin Ruiz MA sent at 1/18/2023 11:28 AM CST -----  Regarding: Repeat labs  ----- Message from BRITTANY Giordano sent at 1/17/2023  5:52 PM CST -----  Please contact patient and inform him that his lab work was reviewed.  UA is negative for any signs of infection, glucose, or occult blood.  Liver and thyroid function are within normal limits.  PSA is excellent 0.64.  Cholesterol levels are well controlled and within reference range.  Of note, kidney function is slightly decreased, 2+ protein is noted in the urine, and microalbumin/creatinine ratio is elevated - increase daily fluid intake, limit protein intake to 2g per day, and avoid OTC NSAIDs. Repeat a BMP and UA in 6 weeks. Also, a mild anemia is present.  Secondary to this, I recommend completing a FOBT tests now, and repeating a CBC in 6 weeks.  Please create a "remind me" to have lab work completed then.    Last collected 01/12/2023    "

## 2023-02-15 NOTE — TELEPHONE ENCOUNTER
Spoke to patient that fasting lab, urine, and stool kit are due. Said he has stool kit at home. Advised him this was due a few weeks ago and to please bring sample when he comes for blood work. Said he will get this done next Thursday. Updated remind me.

## 2023-02-24 LAB
APPEARANCE UR: CLEAR
BACTERIA #/AREA URNS HPF: ABNORMAL /HPF
BACTERIA UR CULT: ABNORMAL
BASOPHILS # BLD AUTO: 156 CELLS/UL (ref 0–200)
BASOPHILS NFR BLD AUTO: 1.7 %
BILIRUB UR QL STRIP: NEGATIVE
BUN SERPL-MCNC: 23 MG/DL (ref 7–25)
BUN/CREAT SERPL: 16 (CALC) (ref 6–22)
CALCIUM SERPL-MCNC: 9.7 MG/DL (ref 8.6–10.3)
CHLORIDE SERPL-SCNC: 107 MMOL/L (ref 98–110)
CO2 SERPL-SCNC: 27 MMOL/L (ref 20–32)
COLOR UR: YELLOW
CREAT SERPL-MCNC: 1.4 MG/DL (ref 0.7–1.35)
EGFR: 54 ML/MIN/1.73M2
EOSINOPHIL # BLD AUTO: 764 CELLS/UL (ref 15–500)
EOSINOPHIL NFR BLD AUTO: 8.3 %
ERYTHROCYTE [DISTWIDTH] IN BLOOD BY AUTOMATED COUNT: 15 % (ref 11–15)
GLUCOSE SERPL-MCNC: 92 MG/DL (ref 65–99)
GLUCOSE UR QL STRIP: NEGATIVE
HCT VFR BLD AUTO: 38.5 % (ref 38.5–50)
HEMOCCULT STL QL IA: NORMAL
HGB BLD-MCNC: 12.9 G/DL (ref 13.2–17.1)
HGB UR QL STRIP: NEGATIVE
HYALINE CASTS #/AREA URNS LPF: ABNORMAL /LPF
KETONES UR QL STRIP: NEGATIVE
LEUKOCYTE ESTERASE UR QL STRIP: NEGATIVE
LYMPHOCYTES # BLD AUTO: 2898 CELLS/UL (ref 850–3900)
LYMPHOCYTES NFR BLD AUTO: 31.5 %
MCH RBC QN AUTO: 30.4 PG (ref 27–33)
MCHC RBC AUTO-ENTMCNC: 33.5 G/DL (ref 32–36)
MCV RBC AUTO: 90.6 FL (ref 80–100)
MONOCYTES # BLD AUTO: 745 CELLS/UL (ref 200–950)
MONOCYTES NFR BLD AUTO: 8.1 %
NEUTROPHILS # BLD AUTO: 4637 CELLS/UL (ref 1500–7800)
NEUTROPHILS NFR BLD AUTO: 50.4 %
NITRITE UR QL STRIP: NEGATIVE
PH UR STRIP: 6 [PH] (ref 5–8)
PLATELET # BLD AUTO: 264 THOUSAND/UL (ref 140–400)
PMV BLD REES-ECKER: 11.1 FL (ref 7.5–12.5)
POTASSIUM SERPL-SCNC: 4.2 MMOL/L (ref 3.5–5.3)
PROT UR QL STRIP: ABNORMAL
RBC # BLD AUTO: 4.25 MILLION/UL (ref 4.2–5.8)
RBC #/AREA URNS HPF: ABNORMAL /HPF
SERVICE CMNT-IMP: ABNORMAL
SODIUM SERPL-SCNC: 140 MMOL/L (ref 135–146)
SP GR UR STRIP: 1.01 (ref 1–1.03)
SQUAMOUS #/AREA URNS HPF: ABNORMAL /HPF
WBC # BLD AUTO: 9.2 THOUSAND/UL (ref 3.8–10.8)
WBC #/AREA URNS HPF: ABNORMAL /HPF

## 2023-03-08 ENCOUNTER — HOSPITAL ENCOUNTER (OUTPATIENT)
Dept: RADIOLOGY | Facility: HOSPITAL | Age: 68
Discharge: HOME | End: 2023-03-08
Attending: OTOLARYNGOLOGY
Payer: MEDICARE

## 2023-03-08 ENCOUNTER — HOSPITAL ENCOUNTER (OUTPATIENT)
Dept: RADIOLOGY | Facility: HOSPITAL | Age: 68
Discharge: HOME | End: 2023-03-08
Attending: OTOLARYNGOLOGY
Payer: COMMERCIAL

## 2023-03-08 DIAGNOSIS — H93.A2 PULSATILE TINNITUS OF LEFT EAR: ICD-10-CM

## 2023-03-08 PROCEDURE — 70544 MR ANGIOGRAPHY HEAD W/O DYE: CPT | Mod: TC,PO

## 2023-03-08 PROCEDURE — 93880 EXTRACRANIAL BILAT STUDY: CPT | Mod: TC,PO

## 2023-03-08 PROCEDURE — 25500020 PHARM REV CODE 255: Mod: PO | Performed by: OTOLARYNGOLOGY

## 2023-03-08 PROCEDURE — A9585 GADOBUTROL INJECTION: HCPCS | Mod: PO | Performed by: OTOLARYNGOLOGY

## 2023-03-08 PROCEDURE — 70553 MRI BRAIN STEM W/O & W/DYE: CPT | Mod: TC,PO

## 2023-03-08 RX ORDER — GADOBUTROL 604.72 MG/ML
7 INJECTION INTRAVENOUS
Status: COMPLETED | OUTPATIENT
Start: 2023-03-08 | End: 2023-03-08

## 2023-03-08 RX ADMIN — GADOBUTROL 7 ML: 604.72 INJECTION INTRAVENOUS at 03:03

## 2023-04-13 DIAGNOSIS — I65.22 STENOSIS OF LEFT CAROTID ARTERY: Primary | ICD-10-CM

## 2023-04-21 ENCOUNTER — HOSPITAL ENCOUNTER (OUTPATIENT)
Dept: RADIOLOGY | Facility: HOSPITAL | Age: 68
Discharge: HOME | End: 2023-04-21
Attending: SURGERY
Payer: MEDICARE

## 2023-04-21 DIAGNOSIS — I65.22 STENOSIS OF LEFT CAROTID ARTERY: ICD-10-CM

## 2023-04-21 LAB
CREAT SERPL-MCNC: 1.7 MG/DL (ref 0.5–1.4)
SAMPLE: ABNORMAL

## 2023-04-21 PROCEDURE — 25500020 PHARM REV CODE 255: Mod: PO | Performed by: SURGERY

## 2023-04-21 PROCEDURE — 70496 CT ANGIOGRAPHY HEAD: CPT | Mod: TC,PO

## 2023-04-21 RX ADMIN — IOHEXOL 100 ML: 350 INJECTION, SOLUTION INTRAVENOUS at 01:04

## 2023-05-03 DIAGNOSIS — E78.2 MIXED HYPERLIPIDEMIA: ICD-10-CM

## 2023-05-03 RX ORDER — ATORVASTATIN CALCIUM 10 MG/1
10 TABLET, FILM COATED ORAL DAILY
Qty: 90 TABLET | Refills: 0 | Status: SHIPPED | OUTPATIENT
Start: 2023-05-03 | End: 2023-05-08 | Stop reason: SDUPTHER

## 2023-05-03 NOTE — TELEPHONE ENCOUNTER
----- Message from Malaika Ward sent at 5/3/2023  2:41 PM CDT -----  Refill On: atorvastatin    [id number] - [place], [place] - [address]    [phone]

## 2023-05-08 ENCOUNTER — OFFICE VISIT (OUTPATIENT)
Dept: FAMILY MEDICINE | Facility: CLINIC | Age: 68
End: 2023-05-08
Payer: MEDICARE

## 2023-05-08 VITALS
SYSTOLIC BLOOD PRESSURE: 130 MMHG | DIASTOLIC BLOOD PRESSURE: 80 MMHG | WEIGHT: 150 LBS | BODY MASS INDEX: 22.73 KG/M2 | HEART RATE: 92 BPM | HEIGHT: 68 IN

## 2023-05-08 DIAGNOSIS — E78.2 MIXED HYPERLIPIDEMIA: ICD-10-CM

## 2023-05-08 DIAGNOSIS — I65.22 LEFT CAROTID STENOSIS: Primary | ICD-10-CM

## 2023-05-08 DIAGNOSIS — I65.01 STENOSIS OF RIGHT VERTEBRAL ARTERY: ICD-10-CM

## 2023-05-08 DIAGNOSIS — I10 PRIMARY HYPERTENSION: ICD-10-CM

## 2023-05-08 DIAGNOSIS — F17.200 SMOKER: ICD-10-CM

## 2023-05-08 PROCEDURE — 4010F ACE/ARB THERAPY RXD/TAKEN: CPT | Mod: CPTII,S$GLB,, | Performed by: FAMILY MEDICINE

## 2023-05-08 PROCEDURE — 1159F MED LIST DOCD IN RCRD: CPT | Mod: CPTII,S$GLB,, | Performed by: FAMILY MEDICINE

## 2023-05-08 PROCEDURE — 3079F DIAST BP 80-89 MM HG: CPT | Mod: CPTII,S$GLB,, | Performed by: FAMILY MEDICINE

## 2023-05-08 PROCEDURE — 1159F PR MEDICATION LIST DOCUMENTED IN MEDICAL RECORD: ICD-10-PCS | Mod: CPTII,S$GLB,, | Performed by: FAMILY MEDICINE

## 2023-05-08 PROCEDURE — 3066F PR DOCUMENTATION OF TREATMENT FOR NEPHROPATHY: ICD-10-PCS | Mod: CPTII,S$GLB,, | Performed by: FAMILY MEDICINE

## 2023-05-08 PROCEDURE — 3075F SYST BP GE 130 - 139MM HG: CPT | Mod: CPTII,S$GLB,, | Performed by: FAMILY MEDICINE

## 2023-05-08 PROCEDURE — 99214 PR OFFICE/OUTPT VISIT, EST, LEVL IV, 30-39 MIN: ICD-10-PCS | Mod: S$GLB,,, | Performed by: FAMILY MEDICINE

## 2023-05-08 PROCEDURE — 4010F PR ACE/ARB THEARPY RXD/TAKEN: ICD-10-PCS | Mod: CPTII,S$GLB,, | Performed by: FAMILY MEDICINE

## 2023-05-08 PROCEDURE — 3288F FALL RISK ASSESSMENT DOCD: CPT | Mod: CPTII,S$GLB,, | Performed by: FAMILY MEDICINE

## 2023-05-08 PROCEDURE — 99214 OFFICE O/P EST MOD 30 MIN: CPT | Mod: S$GLB,,, | Performed by: FAMILY MEDICINE

## 2023-05-08 PROCEDURE — 3288F PR FALLS RISK ASSESSMENT DOCUMENTED: ICD-10-PCS | Mod: CPTII,S$GLB,, | Performed by: FAMILY MEDICINE

## 2023-05-08 PROCEDURE — 3075F PR MOST RECENT SYSTOLIC BLOOD PRESS GE 130-139MM HG: ICD-10-PCS | Mod: CPTII,S$GLB,, | Performed by: FAMILY MEDICINE

## 2023-05-08 PROCEDURE — 3066F NEPHROPATHY DOC TX: CPT | Mod: CPTII,S$GLB,, | Performed by: FAMILY MEDICINE

## 2023-05-08 PROCEDURE — 1101F PR PT FALLS ASSESS DOC 0-1 FALLS W/OUT INJ PAST YR: ICD-10-PCS | Mod: CPTII,S$GLB,, | Performed by: FAMILY MEDICINE

## 2023-05-08 PROCEDURE — 3008F BODY MASS INDEX DOCD: CPT | Mod: CPTII,S$GLB,, | Performed by: FAMILY MEDICINE

## 2023-05-08 PROCEDURE — 3062F PR POS MACROALBUMINURIA RESULT DOCUMENTED/REVIEW: ICD-10-PCS | Mod: CPTII,S$GLB,, | Performed by: FAMILY MEDICINE

## 2023-05-08 PROCEDURE — 1101F PT FALLS ASSESS-DOCD LE1/YR: CPT | Mod: CPTII,S$GLB,, | Performed by: FAMILY MEDICINE

## 2023-05-08 PROCEDURE — 3008F PR BODY MASS INDEX (BMI) DOCUMENTED: ICD-10-PCS | Mod: CPTII,S$GLB,, | Performed by: FAMILY MEDICINE

## 2023-05-08 PROCEDURE — 3062F POS MACROALBUMINURIA REV: CPT | Mod: CPTII,S$GLB,, | Performed by: FAMILY MEDICINE

## 2023-05-08 PROCEDURE — 3079F PR MOST RECENT DIASTOLIC BLOOD PRESSURE 80-89 MM HG: ICD-10-PCS | Mod: CPTII,S$GLB,, | Performed by: FAMILY MEDICINE

## 2023-05-08 RX ORDER — ATORVASTATIN CALCIUM 10 MG/1
10 TABLET, FILM COATED ORAL DAILY
Qty: 90 TABLET | Refills: 3 | Status: SHIPPED | OUTPATIENT
Start: 2023-05-08 | End: 2024-05-07

## 2023-05-08 NOTE — PROGRESS NOTES
SUBJECTIVE:    Patient ID: Koffi Merritt is a 68 y.o. male.    Chief Complaint: Hyperlipidemia (No bottles, need refills, upcoming surgery 5/24/23 Endarterectomy, abc )    68-year-old male still mows the lawn but does not get much exercise otherwise.  He still smokes half pack cigarettes daily.    Had a workup by Dr. Burgess for left ear throbbing sensation.  He was found to have no hearing loss.  Carotid ultrasound have her showed a stenosis in the left greater than right carotids.  Has undergone MRI brain MRA brain and a CTA of the carotids.    IMPRESSION:  1. Atheromatous plaque of the carotid bulbs and ICA origins, resulting in 80% stenoses of both the right and left ICA origins.  2. Focal 70% stenosis of the right vertebral artery.  3. Dense calcified plaque of the carotid siphons, resulting in bilateral multifocal stenoses up to 50%.  4. Otherwise no significant abnormality of the intracranial arterial vasculature.     Scheduled on 05/24/2023 for left carotid endarterectomy by Dr. Frank Rey.  Patient was told to start aspirin 81 mg daily but has not started it yet.  He denies any dizziness or syncope.  He does have some aching in his calves when he walks 1-2 blocks.  Possible claudication.    Stool for blood test was negative this year.      Hospital Outpatient Visit on 04/21/2023   Component Date Value Ref Range Status    POC Creatinine 04/21/2023 1.7 (H)  0.5 - 1.4 mg/dL Final    Sample 04/21/2023 VENOUS   Final   Orders Only on 01/17/2023   Component Date Value Ref Range Status    Fecal Globin, Insure 02/22/2023    Final    Glucose 02/23/2023 92  65 - 99 mg/dL Final    BUN 02/23/2023 23  7 - 25 mg/dL Final    Creatinine 02/23/2023 1.40 (H)  0.70 - 1.35 mg/dL Final    eGFR 02/23/2023 54 (L)  > OR = 60 mL/min/1.73m2 Final    BUN/Creatinine Ratio 02/23/2023 16  6 - 22 (calc) Final    Sodium 02/23/2023 140  135 - 146 mmol/L Final    Potassium 02/23/2023 4.2  3.5 - 5.3 mmol/L Final    Chloride 02/23/2023 107   98 - 110 mmol/L Final    CO2 02/23/2023 27  20 - 32 mmol/L Final    Calcium 02/23/2023 9.7  8.6 - 10.3 mg/dL Final    WBC 02/23/2023 9.2  3.8 - 10.8 Thousand/uL Final    RBC 02/23/2023 4.25  4.20 - 5.80 Million/uL Final    Hemoglobin 02/23/2023 12.9 (L)  13.2 - 17.1 g/dL Final    Hematocrit 02/23/2023 38.5  38.5 - 50.0 % Final    MCV 02/23/2023 90.6  80.0 - 100.0 fL Final    MCH 02/23/2023 30.4  27.0 - 33.0 pg Final    MCHC 02/23/2023 33.5  32.0 - 36.0 g/dL Final    RDW 02/23/2023 15.0  11.0 - 15.0 % Final    Platelets 02/23/2023 264  140 - 400 Thousand/uL Final    MPV 02/23/2023 11.1  7.5 - 12.5 fL Final    Neutrophils, Abs 02/23/2023 4,637  1,500 - 7,800 cells/uL Final    Lymph # 02/23/2023 2,898  850 - 3,900 cells/uL Final    Mono # 02/23/2023 745  200 - 950 cells/uL Final    Eos # 02/23/2023 764 (H)  15 - 500 cells/uL Final    Baso # 02/23/2023 156  0 - 200 cells/uL Final    Neutrophils Relative 02/23/2023 50.4  % Final    Lymph % 02/23/2023 31.5  % Final    Mono % 02/23/2023 8.1  % Final    Eosinophil % 02/23/2023 8.3  % Final    Basophil % 02/23/2023 1.7  % Final    Color, UA 02/23/2023 YELLOW  YELLOW Final    Appearance, UA 02/23/2023 CLEAR  CLEAR Final    Specific Gravity, UA 02/23/2023 1.007  1.001 - 1.035 Final    pH, UA 02/23/2023 6.0  5.0 - 8.0 Final    Glucose, UA 02/23/2023 NEGATIVE  NEGATIVE Final    Bilirubin, UA 02/23/2023 NEGATIVE  NEGATIVE Final    Ketones, UA 02/23/2023 NEGATIVE  NEGATIVE Final    Occult Blood UA 02/23/2023 NEGATIVE  NEGATIVE Final    Protein, UA 02/23/2023 2+ (A)  NEGATIVE Final    Nitrite, UA 02/23/2023 NEGATIVE  NEGATIVE Final    Leukocytes, UA 02/23/2023 NEGATIVE  NEGATIVE Final    WBC Casts, UA 02/23/2023 NONE SEEN  < OR = 5 /HPF Final    RBC Casts, UA 02/23/2023 NONE SEEN  < OR = 2 /HPF Final    Squam Epithel, UA 02/23/2023 NONE SEEN  < OR = 5 /HPF Final    Bacteria, UA 02/23/2023 NONE SEEN  NONE SEEN /HPF Final    Hyaline Casts, UA 02/23/2023 NONE SEEN  NONE SEEN /LPF  Final    Service Cmt: 2023    Final    Reflexive Urine Culture 2023    Final       Past Medical History:   Diagnosis Date    Hypertension      Social History     Socioeconomic History    Marital status:     Number of children: 5   Occupational History    Occupation: Retired , worked at [employer] in [employer]ing    Tobacco Use    Smoking status: Every Day     Packs/day: 0.50     Types: Cigarettes    Smokeless tobacco: Never   Substance and Sexual Activity    Alcohol use: Yes     Alcohol/week: 3.0 standard drinks     Types: 3 Cans of beer per week     Comment: nightly      No past surgical history on file.  Family History   Problem Relation Age of Onset    Diabetes Mother     Kidney failure Mother          at 82    Heart failure Father          in the late 50's.     Coronary artery disease Brother         stent placed, DM II, alive at 70       Review of patient's allergies indicates:  No Known Allergies    Current Outpatient Medications:     amLODIPine-benazepriL (LOTREL) 10-40 mg per capsule, Take 1 capsule by mouth once daily., Disp: , Rfl:     tamsulosin (FLOMAX) 0.4 mg Cap, Take 0.4 mg by mouth 2 (two) times a day., Disp: , Rfl:     atorvastatin (LIPITOR) 10 MG tablet, Take 1 tablet (10 mg total) by mouth once daily., Disp: 90 tablet, Rfl: 3    Review of Systems   Constitutional:  Negative for appetite change, chills, fatigue, fever and unexpected weight change.   HENT:  Positive for ear pain (Left ear throbbing sensation). Negative for trouble swallowing.    Eyes:  Negative for pain, discharge and visual disturbance.   Respiratory:  Negative for apnea, cough, shortness of breath and wheezing.    Cardiovascular:  Negative for chest pain and leg swelling.   Gastrointestinal:  Negative for abdominal pain, blood in stool, constipation, diarrhea, nausea, vomiting and reflux.   Endocrine: Negative for cold intolerance, heat intolerance and polydipsia.   Genitourinary:  Negative for bladder  "incontinence, dysuria, erectile dysfunction, frequency, hematuria, testicular pain and urgency.   Musculoskeletal:  Negative for gait problem, joint swelling and myalgias.   Neurological:  Negative for dizziness, seizures and numbness.   Psychiatric/Behavioral:  Negative for agitation, behavioral problems and hallucinations. The patient is not nervous/anxious.          Objective:      Vitals:    05/08/23 0858   BP: 130/80   Pulse: 92   Weight: 68 kg (150 lb)   Height: 5' 8" (1.727 m)     Physical Exam  Vitals and nursing note reviewed.   Constitutional:       General: He is not in acute distress.     Appearance: Normal appearance. He is well-developed. He is not toxic-appearing.   HENT:      Head: Normocephalic and atraumatic.      Right Ear: Tympanic membrane and external ear normal.      Left Ear: Tympanic membrane and external ear normal.      Nose: Nose normal.      Mouth/Throat:      Pharynx: Oropharynx is clear.   Eyes:      Pupils: Pupils are equal, round, and reactive to light.   Neck:      Thyroid: No thyromegaly.      Vascular: No carotid bruit.   Cardiovascular:      Rate and Rhythm: Normal rate and regular rhythm.      Heart sounds: Normal heart sounds. No murmur heard.  Pulmonary:      Effort: Pulmonary effort is normal.      Breath sounds: Normal breath sounds. No wheezing or rales.   Abdominal:      General: Bowel sounds are normal. There is no distension.      Palpations: Abdomen is soft.      Tenderness: There is no abdominal tenderness.   Musculoskeletal:         General: No tenderness or deformity. Normal range of motion.      Cervical back: Normal range of motion and neck supple.      Lumbar back: Normal. No spasms.      Comments: Bends 90 degrees at  waist, shoulders and knees good range of motion with no crepitance.  No pitting edema to lower extremities   Lymphadenopathy:      Cervical: No cervical adenopathy.   Skin:     General: Skin is warm and dry.      Findings: No rash.   Neurological: "      Mental Status: He is alert and oriented to person, place, and time. Mental status is at baseline.      Cranial Nerves: No cranial nerve deficit.      Coordination: Coordination normal.   Psychiatric:         Mood and Affect: Mood normal.         Behavior: Behavior normal.         Thought Content: Thought content normal.         Judgment: Judgment normal.         Assessment:       1. Left carotid stenosis    2. Mixed hyperlipidemia    3. Stenosis of right vertebral artery    4. Smoker    5. Primary hypertension         Plan:       Left carotid stenosis  Patient is medically cleared for left carotid endarterectomy with Dr. Frank Rey 05/24/2023, encouraged to start aspirin 81 mg daily, stop aspirin 7 days prior to surgery and resume 1 day after surgery.  Mixed hyperlipidemia  -     atorvastatin (LIPITOR) 10 MG tablet; Take 1 tablet (10 mg total) by mouth once daily.  Dispense: 90 tablet; Refill: 3  Continue atorvastatin daily  Stenosis of right vertebral artery    Smoker  Patient encouraged to consider smoking cessation depression with peripheral vascular disease and stenosis of arteries.  Primary hypertension  Blood pressure well controlled    Follow up in about 3 months (around 8/8/2023), or [facility]- CE[facility].        5/8/2023 Neri Blue

## 2023-05-22 ENCOUNTER — HOSPITAL ENCOUNTER (OUTPATIENT)
Dept: PREADMISSION TESTING | Facility: HOSPITAL | Age: 68
Discharge: HOME | End: 2023-05-22
Attending: SURGERY
Payer: MEDICARE

## 2023-05-22 ENCOUNTER — HOSPITAL ENCOUNTER (OUTPATIENT)
Dept: RADIOLOGY | Facility: HOSPITAL | Age: 68
Discharge: HOME | End: 2023-05-22
Payer: MEDICARE

## 2023-05-22 ENCOUNTER — HOSPITAL ENCOUNTER (OUTPATIENT)
Dept: RADIOLOGY | Facility: HOSPITAL | Age: 68
Discharge: HOME | End: 2023-05-22
Attending: SURGERY
Payer: MEDICARE

## 2023-05-22 VITALS
TEMPERATURE: 99 F | OXYGEN SATURATION: 98 % | BODY MASS INDEX: 22.72 KG/M2 | DIASTOLIC BLOOD PRESSURE: 74 MMHG | SYSTOLIC BLOOD PRESSURE: 122 MMHG | RESPIRATION RATE: 18 BRPM | WEIGHT: 149.94 LBS | HEART RATE: 76 BPM | HEIGHT: 68 IN

## 2023-05-22 DIAGNOSIS — D69.9 ANTICOAGULANT DISORDER: ICD-10-CM

## 2023-05-22 DIAGNOSIS — Z01.818 OTHER SPECIFIED PRE-OPERATIVE EXAMINATION: Primary | ICD-10-CM

## 2023-05-22 DIAGNOSIS — Z01.818 OTHER SPECIFIED PRE-OPERATIVE EXAMINATION: ICD-10-CM

## 2023-05-22 DIAGNOSIS — Z01.818 PRE-OP TESTING: ICD-10-CM

## 2023-05-22 DIAGNOSIS — Z51.81 MONITORING FOR ANTICOAGULANT USE: ICD-10-CM

## 2023-05-22 DIAGNOSIS — Z79.01 MONITORING FOR ANTICOAGULANT USE: ICD-10-CM

## 2023-05-22 DIAGNOSIS — Z41.9 SURGERY, ELECTIVE: ICD-10-CM

## 2023-05-22 DIAGNOSIS — Z01.818 PRE-OP TESTING: Primary | ICD-10-CM

## 2023-05-22 PROCEDURE — 93005 ELECTROCARDIOGRAM TRACING: CPT | Performed by: SPECIALIST

## 2023-05-22 PROCEDURE — 71046 X-RAY EXAM CHEST 2 VIEWS: CPT | Mod: TC

## 2023-05-22 PROCEDURE — 93010 EKG 12-LEAD: ICD-10-PCS | Mod: ,,, | Performed by: SPECIALIST

## 2023-05-22 PROCEDURE — 93010 ELECTROCARDIOGRAM REPORT: CPT | Mod: ,,, | Performed by: SPECIALIST

## 2023-05-22 RX ORDER — IBUPROFEN 100 MG/5ML
1000 SUSPENSION, ORAL (FINAL DOSE FORM) ORAL DAILY
COMMUNITY

## 2023-05-22 RX ORDER — CEFAZOLIN SODIUM 2 G/50ML
2 SOLUTION INTRAVENOUS ONCE
Status: CANCELLED | OUTPATIENT
Start: 2023-05-24

## 2023-05-22 RX ORDER — ASPIRIN 81 MG/1
81 TABLET ORAL DAILY
COMMUNITY

## 2023-05-22 NOTE — PRE-PROCEDURE INSTRUCTIONS
Preadmit assessment complete. Review of pt health history and home medications.  Preop education per AVS. Pt voiced understanding       Advised not to take amlodipine/ benazepril am of surgery States last dose of aspirin was today . Will not take any ore prior to surgery

## 2023-05-22 NOTE — DISCHARGE INSTRUCTIONS
To confirm, Your doctor has instructed you that surgery is scheduled for: May 24   Pre-Op will call the afternoon prior to surgery between 4:00 and 6:00 PM with the final arrival time.       1 Person can come with you the day of surgery.    Please park in the Garage Parking and come through front entrance.      GO TO REGISTRATION     After registration, proceed past gift shop and through glass door ( [facility] [place]) Check in at the nurses station to the left.   Do not eat or drink anything after midnight the night before your surgery - THIS INCLUDES  WATER, GUM, MINTS AND CANDY.  YOU MAY BRUSH YOUR TEETH BUT DO NOT SWALLOW     TAKE ONLY THESE MEDICATIONS WITH A SMALL SIP OF WATER THE MORNING OF YOUR PROCEDURE:NONE      PLEASE NOTE:  The surgery schedule has many variables which may affect the time of your surgery case.  Family members should be available if your surgery time changes.  Plan to be here the day of your procedure between 4-6 hours.      DO NOT TAKE THESE MEDICATIONS 5-7 DAYS PRIOR to your procedure or per your surgeon's request: ASPIRIN, ALEVE, ADVIL, IBUPROFEN,  RODDY SELTZER, BC , FISH OIL , VITAMIN E, HERBALS  (May take Tylenol)                                                          IMPORTANT INSTRUCTIONS      Do not smoke, vape or drink alcoholic beverages 24 hours prior to your procedure.  Shower the night before AND the morning of your procedure with a Chlorhexidine wash such as Hibiclens or Dial antibacterial soap from the neck down.   No lotions, powder or oils on your skin after you shower. DO NOT WEAR DEODORANT   Do not get it on your face or in your eyes.  You may use your own shampoo and face wash. This helps your skin to be as bacteria free as possible.    DO NOT remove hair from the surgery site.  Do not shave the incision site unless you are given specific instructions to do so.    Sleep in a bed with clean sheets.    Do not sleep with a pet in the bed.   If you wear contact  lenses, dentures, hearing aids or glasses, bring a container to put them in during surgery and give to a family member for safe keeping.    Please leave all jewelry, piercing's and valuables at home.   Wear rubber soled shoes (no flip flops).    If your doctor has scheduled you for an overnight stay, bring a small overnight bag with any personal items you need.    Make arrangements in advance for transportation home by a responsible adult.      You must make arrangements for transportation, TAXI'S, UBER'S OR LYFTS ARE NOT ALLOWED.        If you have any questions about these instructions, call (Monday - Friday) Pre-Op Admit  Nursing  at [phone] or the Pre-Op Day Surgery Unit at [phone].

## 2023-06-05 ENCOUNTER — HOSPITAL ENCOUNTER (OUTPATIENT)
Dept: PREADMISSION TESTING | Facility: HOSPITAL | Age: 68
Discharge: HOME | End: 2023-06-05

## 2023-06-05 ENCOUNTER — ANESTHESIA EVENT (OUTPATIENT)
Dept: SURGERY | Facility: HOSPITAL | Age: 68
DRG: 039 | End: 2023-06-05
Payer: MEDICARE

## 2023-06-05 ENCOUNTER — ANESTHESIA (OUTPATIENT)
Dept: SURGERY | Facility: HOSPITAL | Age: 68
DRG: 039 | End: 2023-06-05
Payer: MEDICARE

## 2023-06-05 ENCOUNTER — HOSPITAL ENCOUNTER (INPATIENT)
Facility: HOSPITAL | Age: 68
LOS: 1 days | Discharge: HOME | DRG: 039 | End: 2023-06-06
Attending: SURGERY | Admitting: SURGERY
Payer: MEDICARE

## 2023-06-05 DIAGNOSIS — I65.22 LEFT CAROTID STENOSIS: Primary | ICD-10-CM

## 2023-06-05 DIAGNOSIS — Z41.9 SURGERY, ELECTIVE: ICD-10-CM

## 2023-06-05 LAB
ABO + RH BLD: NORMAL
BLD GP AB SCN CELLS X3 SERPL QL: NORMAL
POC ACTIVATED CLOTTING TIME K: 137 SEC (ref 74–137)
POC ACTIVATED CLOTTING TIME K: 143 SEC (ref 74–137)
POC ACTIVATED CLOTTING TIME K: 281 SEC (ref 74–137)
POC ACTIVATED CLOTTING TIME K: 341 SEC (ref 74–137)
SAMPLE: ABNORMAL
SAMPLE: NORMAL

## 2023-06-05 PROCEDURE — 63600175 PHARM REV CODE 636 W HCPCS: Performed by: SURGERY

## 2023-06-05 PROCEDURE — 25000003 PHARM REV CODE 250: Performed by: NURSE ANESTHETIST, CERTIFIED REGISTERED

## 2023-06-05 PROCEDURE — 27000221 HC OXYGEN, UP TO 24 HOURS

## 2023-06-05 PROCEDURE — 27800903 OPTIME MED/SURG SUP & DEVICES OTHER IMPLANTS: Performed by: SURGERY

## 2023-06-05 PROCEDURE — D9220A PRA ANESTHESIA: Mod: ,,, | Performed by: STUDENT IN AN ORGANIZED HEALTH CARE EDUCATION/TRAINING PROGRAM

## 2023-06-05 PROCEDURE — 63600175 PHARM REV CODE 636 W HCPCS: Performed by: NURSE ANESTHETIST, CERTIFIED REGISTERED

## 2023-06-05 PROCEDURE — 63600175 PHARM REV CODE 636 W HCPCS: Performed by: STUDENT IN AN ORGANIZED HEALTH CARE EDUCATION/TRAINING PROGRAM

## 2023-06-05 PROCEDURE — 71000039 HC RECOVERY, EACH ADD'L HOUR: Performed by: SURGERY

## 2023-06-05 PROCEDURE — 71000033 HC RECOVERY, INTIAL HOUR: Performed by: SURGERY

## 2023-06-05 PROCEDURE — 36000706: Performed by: SURGERY

## 2023-06-05 PROCEDURE — 82803 BLOOD GASES ANY COMBINATION: CPT

## 2023-06-05 PROCEDURE — 36620 INSERTION CATHETER ARTERY: CPT | Performed by: STUDENT IN AN ORGANIZED HEALTH CARE EDUCATION/TRAINING PROGRAM

## 2023-06-05 PROCEDURE — 25000003 PHARM REV CODE 250: Performed by: SURGERY

## 2023-06-05 PROCEDURE — 25000003 PHARM REV CODE 250: Performed by: STUDENT IN AN ORGANIZED HEALTH CARE EDUCATION/TRAINING PROGRAM

## 2023-06-05 PROCEDURE — 20000000 HC ICU ROOM

## 2023-06-05 PROCEDURE — 99900031 HC PATIENT EDUCATION (STAT)

## 2023-06-05 PROCEDURE — 94761 N-INVAS EAR/PLS OXIMETRY MLT: CPT

## 2023-06-05 PROCEDURE — 88311 DECALCIFY TISSUE: CPT | Mod: TC

## 2023-06-05 PROCEDURE — 86900 BLOOD TYPING SEROLOGIC ABO: CPT | Performed by: SURGERY

## 2023-06-05 PROCEDURE — 36620 ARTERIAL: ICD-10-PCS | Mod: ,,, | Performed by: STUDENT IN AN ORGANIZED HEALTH CARE EDUCATION/TRAINING PROGRAM

## 2023-06-05 PROCEDURE — 88304 TISSUE EXAM BY PATHOLOGIST: CPT | Mod: TC

## 2023-06-05 PROCEDURE — 37000009 HC ANESTHESIA EA ADD 15 MINS: Performed by: SURGERY

## 2023-06-05 PROCEDURE — 36620 INSERTION CATHETER ARTERY: CPT | Mod: ,,, | Performed by: STUDENT IN AN ORGANIZED HEALTH CARE EDUCATION/TRAINING PROGRAM

## 2023-06-05 PROCEDURE — C9248 INJ, CLEVIDIPINE BUTYRATE: HCPCS | Mod: JZ,JG | Performed by: NURSE ANESTHETIST, CERTIFIED REGISTERED

## 2023-06-05 PROCEDURE — 36000707: Performed by: SURGERY

## 2023-06-05 PROCEDURE — 27201423 OPTIME MED/SURG SUP & DEVICES STERILE SUPPLY: Performed by: SURGERY

## 2023-06-05 PROCEDURE — 37000008 HC ANESTHESIA 1ST 15 MINUTES: Performed by: SURGERY

## 2023-06-05 PROCEDURE — C1781 MESH (IMPLANTABLE): HCPCS | Performed by: SURGERY

## 2023-06-05 PROCEDURE — D9220A PRA ANESTHESIA: ICD-10-PCS | Mod: ,,, | Performed by: STUDENT IN AN ORGANIZED HEALTH CARE EDUCATION/TRAINING PROGRAM

## 2023-06-05 DEVICE — PATCH VASCULAR XENOSURE .8X8  E0.8P8: Type: IMPLANTABLE DEVICE | Site: CAROTID | Status: FUNCTIONAL

## 2023-06-05 RX ORDER — DEXAMETHASONE SODIUM PHOSPHATE 4 MG/ML
INJECTION, SOLUTION INTRA-ARTICULAR; INTRALESIONAL; INTRAMUSCULAR; INTRAVENOUS; SOFT TISSUE
Status: DISCONTINUED | OUTPATIENT
Start: 2023-06-05 | End: 2023-06-05

## 2023-06-05 RX ORDER — HEPARIN SODIUM 10000 [USP'U]/ML
INJECTION, SOLUTION INTRAVENOUS; SUBCUTANEOUS
Status: DISCONTINUED | OUTPATIENT
Start: 2023-06-05 | End: 2023-06-05

## 2023-06-05 RX ORDER — ASPIRIN 325 MG
325 TABLET ORAL DAILY
Status: DISCONTINUED | OUTPATIENT
Start: 2023-06-05 | End: 2023-06-05

## 2023-06-05 RX ORDER — TAMSULOSIN HYDROCHLORIDE 0.4 MG/1
0.4 CAPSULE ORAL 2 TIMES DAILY
Status: DISCONTINUED | OUTPATIENT
Start: 2023-06-05 | End: 2023-06-06 | Stop reason: HOSPADM

## 2023-06-05 RX ORDER — MUPIROCIN 20 MG/G
OINTMENT TOPICAL 2 TIMES DAILY
Status: DISCONTINUED | OUTPATIENT
Start: 2023-06-05 | End: 2023-06-06 | Stop reason: HOSPADM

## 2023-06-05 RX ORDER — ETOMIDATE 2 MG/ML
INJECTION INTRAVENOUS
Status: DISCONTINUED | OUTPATIENT
Start: 2023-06-05 | End: 2023-06-05

## 2023-06-05 RX ORDER — AMLODIPINE AND BENAZEPRIL HYDROCHLORIDE 10; 40 MG/1; MG/1
1 CAPSULE ORAL DAILY
Status: DISCONTINUED | OUTPATIENT
Start: 2023-06-05 | End: 2023-06-05

## 2023-06-05 RX ORDER — ACETAMINOPHEN 10 MG/ML
INJECTION, SOLUTION INTRAVENOUS
Status: DISCONTINUED | OUTPATIENT
Start: 2023-06-05 | End: 2023-06-05

## 2023-06-05 RX ORDER — DIPHENHYDRAMINE HYDROCHLORIDE 50 MG/ML
12.5 INJECTION INTRAMUSCULAR; INTRAVENOUS
Status: DISCONTINUED | OUTPATIENT
Start: 2023-06-05 | End: 2023-06-05 | Stop reason: HOSPADM

## 2023-06-05 RX ORDER — PROTAMINE SULFATE 10 MG/ML
INJECTION, SOLUTION INTRAVENOUS
Status: DISCONTINUED | OUTPATIENT
Start: 2023-06-05 | End: 2023-06-05

## 2023-06-05 RX ORDER — HYDROCODONE BITARTRATE AND ACETAMINOPHEN 5; 325 MG/1; MG/1
1 TABLET ORAL EVERY 4 HOURS PRN
Status: DISCONTINUED | OUTPATIENT
Start: 2023-06-05 | End: 2023-06-06 | Stop reason: HOSPADM

## 2023-06-05 RX ORDER — OXYCODONE HYDROCHLORIDE 5 MG/1
5 TABLET ORAL
Status: DISCONTINUED | OUTPATIENT
Start: 2023-06-05 | End: 2023-06-05 | Stop reason: HOSPADM

## 2023-06-05 RX ORDER — HEPARIN SODIUM 5000 [USP'U]/ML
INJECTION, SOLUTION INTRAVENOUS; SUBCUTANEOUS
Status: DISCONTINUED | OUTPATIENT
Start: 2023-06-05 | End: 2023-06-05 | Stop reason: HOSPADM

## 2023-06-05 RX ORDER — ONDANSETRON 2 MG/ML
INJECTION INTRAMUSCULAR; INTRAVENOUS
Status: DISCONTINUED | OUTPATIENT
Start: 2023-06-05 | End: 2023-06-05

## 2023-06-05 RX ORDER — LIDOCAINE HYDROCHLORIDE 20 MG/ML
JELLY TOPICAL
Status: DISCONTINUED | OUTPATIENT
Start: 2023-06-05 | End: 2023-06-05

## 2023-06-05 RX ORDER — PROPOFOL 10 MG/ML
VIAL (ML) INTRAVENOUS
Status: DISCONTINUED | OUTPATIENT
Start: 2023-06-05 | End: 2023-06-05

## 2023-06-05 RX ORDER — LISINOPRIL 20 MG/1
40 TABLET ORAL DAILY
Status: DISCONTINUED | OUTPATIENT
Start: 2023-06-05 | End: 2023-06-06 | Stop reason: HOSPADM

## 2023-06-05 RX ORDER — ONDANSETRON 2 MG/ML
4 INJECTION INTRAMUSCULAR; INTRAVENOUS EVERY 12 HOURS PRN
Status: DISCONTINUED | OUTPATIENT
Start: 2023-06-05 | End: 2023-06-06 | Stop reason: HOSPADM

## 2023-06-05 RX ORDER — HYDROMORPHONE HYDROCHLORIDE 1 MG/ML
1 INJECTION, SOLUTION INTRAMUSCULAR; INTRAVENOUS; SUBCUTANEOUS EVERY 4 HOURS PRN
Status: DISCONTINUED | OUTPATIENT
Start: 2023-06-05 | End: 2023-06-06 | Stop reason: HOSPADM

## 2023-06-05 RX ORDER — HYDROMORPHONE HYDROCHLORIDE 1 MG/ML
0.2 INJECTION, SOLUTION INTRAMUSCULAR; INTRAVENOUS; SUBCUTANEOUS EVERY 5 MIN PRN
Status: DISCONTINUED | OUTPATIENT
Start: 2023-06-05 | End: 2023-06-05 | Stop reason: HOSPADM

## 2023-06-05 RX ORDER — ROCURONIUM BROMIDE 10 MG/ML
INJECTION, SOLUTION INTRAVENOUS
Status: DISCONTINUED | OUTPATIENT
Start: 2023-06-05 | End: 2023-06-05

## 2023-06-05 RX ORDER — ASCORBIC ACID 500 MG
1000 TABLET ORAL DAILY
Status: DISCONTINUED | OUTPATIENT
Start: 2023-06-05 | End: 2023-06-06 | Stop reason: HOSPADM

## 2023-06-05 RX ORDER — MIDAZOLAM HYDROCHLORIDE 1 MG/ML
INJECTION INTRAMUSCULAR; INTRAVENOUS
Status: DISCONTINUED | OUTPATIENT
Start: 2023-06-05 | End: 2023-06-05

## 2023-06-05 RX ORDER — PHENYLEPHRINE HCL IN 0.9% NACL 20MG/250ML
0-5 PLASTIC BAG, INJECTION (ML) INTRAVENOUS CONTINUOUS
Status: DISCONTINUED | OUTPATIENT
Start: 2023-06-05 | End: 2023-06-06 | Stop reason: HOSPADM

## 2023-06-05 RX ORDER — FAMOTIDINE 10 MG/ML
INJECTION INTRAVENOUS
Status: DISCONTINUED | OUTPATIENT
Start: 2023-06-05 | End: 2023-06-05

## 2023-06-05 RX ORDER — SODIUM CHLORIDE 9 MG/ML
INJECTION, SOLUTION INTRAVENOUS CONTINUOUS
Status: DISCONTINUED | OUTPATIENT
Start: 2023-06-05 | End: 2023-06-06 | Stop reason: HOSPADM

## 2023-06-05 RX ORDER — CEFAZOLIN SODIUM 2 G/50ML
2 SOLUTION INTRAVENOUS ONCE
Status: COMPLETED | OUTPATIENT
Start: 2023-06-05 | End: 2023-06-05

## 2023-06-05 RX ORDER — CEFAZOLIN SODIUM 2 G/50ML
2 SOLUTION INTRAVENOUS
Status: COMPLETED | OUTPATIENT
Start: 2023-06-05 | End: 2023-06-06

## 2023-06-05 RX ORDER — ASPIRIN 81 MG/1
81 TABLET ORAL DAILY
Status: DISCONTINUED | OUTPATIENT
Start: 2023-06-06 | End: 2023-06-06 | Stop reason: HOSPADM

## 2023-06-05 RX ORDER — CEFAZOLIN SODIUM 1 G/3ML
INJECTION, POWDER, FOR SOLUTION INTRAMUSCULAR; INTRAVENOUS
Status: DISCONTINUED | OUTPATIENT
Start: 2023-06-05 | End: 2023-06-05 | Stop reason: HOSPADM

## 2023-06-05 RX ORDER — ONDANSETRON 2 MG/ML
4 INJECTION INTRAMUSCULAR; INTRAVENOUS DAILY PRN
Status: DISCONTINUED | OUTPATIENT
Start: 2023-06-05 | End: 2023-06-05 | Stop reason: HOSPADM

## 2023-06-05 RX ORDER — AMLODIPINE BESYLATE 5 MG/1
10 TABLET ORAL DAILY
Status: DISCONTINUED | OUTPATIENT
Start: 2023-06-05 | End: 2023-06-06 | Stop reason: HOSPADM

## 2023-06-05 RX ORDER — LIDOCAINE HYDROCHLORIDE 40 MG/ML
SOLUTION TOPICAL
Status: DISCONTINUED | OUTPATIENT
Start: 2023-06-05 | End: 2023-06-05

## 2023-06-05 RX ORDER — LIDOCAINE HYDROCHLORIDE 20 MG/ML
INJECTION, SOLUTION EPIDURAL; INFILTRATION; INTRACAUDAL; PERINEURAL
Status: DISCONTINUED | OUTPATIENT
Start: 2023-06-05 | End: 2023-06-05

## 2023-06-05 RX ORDER — FENTANYL CITRATE 50 UG/ML
INJECTION, SOLUTION INTRAMUSCULAR; INTRAVENOUS
Status: DISCONTINUED | OUTPATIENT
Start: 2023-06-05 | End: 2023-06-05

## 2023-06-05 RX ORDER — LIDOCAINE HYDROCHLORIDE 10 MG/ML
INJECTION INFILTRATION; PERINEURAL
Status: DISCONTINUED | OUTPATIENT
Start: 2023-06-05 | End: 2023-06-05 | Stop reason: HOSPADM

## 2023-06-05 RX ORDER — ASPIRIN 300 MG/1
300 SUPPOSITORY RECTAL DAILY
Status: DISPENSED | OUTPATIENT
Start: 2023-06-05 | End: 2023-06-06

## 2023-06-05 RX ORDER — ATORVASTATIN CALCIUM 10 MG/1
10 TABLET, FILM COATED ORAL DAILY
Status: DISCONTINUED | OUTPATIENT
Start: 2023-06-05 | End: 2023-06-06 | Stop reason: HOSPADM

## 2023-06-05 RX ADMIN — FAMOTIDINE 20 MG: 10 INJECTION, SOLUTION INTRAVENOUS at 09:06

## 2023-06-05 RX ADMIN — OXYCODONE HYDROCHLORIDE 5 MG: 5 TABLET ORAL at 12:06

## 2023-06-05 RX ADMIN — HEPARIN SODIUM 7500 UNITS: 10000 INJECTION, SOLUTION INTRAVENOUS; SUBCUTANEOUS at 10:06

## 2023-06-05 RX ADMIN — SUGAMMADEX 200 MG: 100 INJECTION, SOLUTION INTRAVENOUS at 11:06

## 2023-06-05 RX ADMIN — SODIUM CHLORIDE, SODIUM LACTATE, POTASSIUM CHLORIDE, AND CALCIUM CHLORIDE: .6; .31; .03; .02 INJECTION, SOLUTION INTRAVENOUS at 09:06

## 2023-06-05 RX ADMIN — LIDOCAINE HYDROCHLORIDE 2 ML: 40 SOLUTION TOPICAL at 09:06

## 2023-06-05 RX ADMIN — ATORVASTATIN CALCIUM 10 MG: 10 TABLET, FILM COATED ORAL at 03:06

## 2023-06-05 RX ADMIN — HYDROMORPHONE HYDROCHLORIDE 0.2 MG: 1 INJECTION, SOLUTION INTRAMUSCULAR; INTRAVENOUS; SUBCUTANEOUS at 12:06

## 2023-06-05 RX ADMIN — CLEVIPIDINE 1 MG/HR: 0.5 EMULSION INTRAVENOUS at 11:06

## 2023-06-05 RX ADMIN — LIDOCAINE HYDROCHLORIDE 80 MG: 20 INJECTION, SOLUTION INTRAVENOUS at 09:06

## 2023-06-05 RX ADMIN — MIDAZOLAM HYDROCHLORIDE 1 MG: 1 INJECTION, SOLUTION INTRAMUSCULAR; INTRAVENOUS at 11:06

## 2023-06-05 RX ADMIN — FENTANYL CITRATE 50 MCG: 50 INJECTION, SOLUTION INTRAMUSCULAR; INTRAVENOUS at 11:06

## 2023-06-05 RX ADMIN — FENTANYL CITRATE 50 MCG: 50 INJECTION, SOLUTION INTRAMUSCULAR; INTRAVENOUS at 09:06

## 2023-06-05 RX ADMIN — CEFAZOLIN SODIUM 2 G: 2 SOLUTION INTRAVENOUS at 05:06

## 2023-06-05 RX ADMIN — HYDROMORPHONE HYDROCHLORIDE 0.2 MG: 1 INJECTION, SOLUTION INTRAMUSCULAR; INTRAVENOUS; SUBCUTANEOUS at 11:06

## 2023-06-05 RX ADMIN — MIDAZOLAM HYDROCHLORIDE 1 MG: 1 INJECTION, SOLUTION INTRAMUSCULAR; INTRAVENOUS at 09:06

## 2023-06-05 RX ADMIN — TAMSULOSIN HYDROCHLORIDE 0.4 MG: 0.4 CAPSULE ORAL at 08:06

## 2023-06-05 RX ADMIN — DEXAMETHASONE SODIUM PHOSPHATE 8 MG: 4 INJECTION, SOLUTION INTRAMUSCULAR; INTRAVENOUS at 09:06

## 2023-06-05 RX ADMIN — ACETAMINOPHEN 1000 MG: 10 INJECTION, SOLUTION INTRAVENOUS at 10:06

## 2023-06-05 RX ADMIN — OXYCODONE HYDROCHLORIDE AND ACETAMINOPHEN 1000 MG: 500 TABLET ORAL at 03:06

## 2023-06-05 RX ADMIN — LIDOCAINE HYDROCHLORIDE 1 EACH: 20 JELLY TOPICAL at 09:06

## 2023-06-05 RX ADMIN — MUPIROCIN 1 G: 20 OINTMENT TOPICAL at 08:06

## 2023-06-05 RX ADMIN — PROTAMINE SULFATE 75 MG: 10 INJECTION, SOLUTION INTRAVENOUS at 11:06

## 2023-06-05 RX ADMIN — ROCURONIUM BROMIDE 50 MG: 10 INJECTION, SOLUTION INTRAVENOUS at 09:06

## 2023-06-05 RX ADMIN — TAMSULOSIN HYDROCHLORIDE 0.4 MG: 0.4 CAPSULE ORAL at 03:06

## 2023-06-05 RX ADMIN — ETOMIDATE 16 MG: 2 INJECTION, SOLUTION INTRAVENOUS at 09:06

## 2023-06-05 RX ADMIN — PHENYLEPHRINE HYDROCHLORIDE 0.5 MCG/KG/MIN: 10 INJECTION INTRAVENOUS at 10:06

## 2023-06-05 RX ADMIN — ONDANSETRON 4 MG: 2 INJECTION INTRAMUSCULAR; INTRAVENOUS at 03:06

## 2023-06-05 RX ADMIN — THERA TABS 1 TABLET: TAB at 03:06

## 2023-06-05 RX ADMIN — CEFAZOLIN SODIUM 2 G: 2 SOLUTION INTRAVENOUS at 10:06

## 2023-06-05 RX ADMIN — PROPOFOL 50 MG: 10 INJECTION, EMULSION INTRAVENOUS at 09:06

## 2023-06-05 RX ADMIN — ASPIRIN 325 MG ORAL TABLET 325 MG: 325 PILL ORAL at 12:06

## 2023-06-05 RX ADMIN — ROCURONIUM BROMIDE 50 MG: 10 INJECTION, SOLUTION INTRAVENOUS at 10:06

## 2023-06-05 RX ADMIN — HYDROCODONE BITARTRATE AND ACETAMINOPHEN 1 TABLET: 5; 325 TABLET ORAL at 03:06

## 2023-06-05 RX ADMIN — ONDANSETRON 4 MG: 2 INJECTION INTRAMUSCULAR; INTRAVENOUS at 09:06

## 2023-06-05 RX ADMIN — SODIUM CHLORIDE: 0.9 INJECTION, SOLUTION INTRAVENOUS at 02:06

## 2023-06-05 NOTE — ANESTHESIA PROCEDURE NOTES
Arterial    Diagnosis: Carotid stenosis    Patient location during procedure: holding area  Procedure start time: 6/5/2023 9:29 AM  Timeout: 6/5/2023 9:29 AM  Procedure end time: 6/5/2023 9:29 AM    Staffing  Authorizing Provider: Armani Robins MD  Performing Provider: Armani Robins MD    Anesthesiologist was present at the time of the procedure.    Preanesthetic Checklist  Completed: patient identified, IV checked, site marked, risks and benefits discussed, surgical consent, monitors and equipment checked, pre-op evaluation, timeout performed and anesthesia consent givenArterial  Skin Prep: chlorhexidine gluconate  Local Infiltration: lidocaine  Orientation: left  Location: radial    Catheter Size: 20 G  Catheter placement by Ultrasound guidance. Heme positive aspiration all ports.   Vessel Caliber: medium, patent, compressibility normal  Needle advanced into vessel with real time Ultrasound guidance.  Sterile sheath used.Insertion Attempts: 1  Assessment  Dressing: secured with tape and tegaderm and sutured in place and taped  Patient: Tolerated well

## 2023-06-05 NOTE — TRANSFER OF CARE
Anesthesia Transfer of Care Note    Patient: Koffi Merritt    Procedure(s) Performed: Procedure(s) (LRB):  ENDARTERECTOMY, CAROTID (Left)    Patient location: PACU    Anesthesia Type: general    Transport from OR: Transported from OR on room air with adequate spontaneous ventilation    Post pain: adequate analgesia    Post assessment: no apparent anesthetic complications and tolerated procedure well    Post vital signs: stable    Level of consciousness: responds to stimulation    Nausea/Vomiting: no nausea/vomiting    Complications: none    Transfer of care protocol was followedComments: SV, exchanging well.  PINEDA to command and spontaneously To PACU, VSS upon arrival. Report to RN       Last vitals:   Visit Vitals  /70 (BP Location: Left arm, Patient Position: Lying)   Pulse 67   Temp 36.9 °C (98.5 °F) (Oral)   Resp 16   Wt 68 kg (149 lb 14.6 oz)   SpO2 97%   BMI 22.79 kg/m²

## 2023-06-05 NOTE — NURSING
Nurses Note -- 4 Eyes      6/5/2023   6:18 PM      Skin assessed during: Admit      [] No Altered Skin Integrity Present    []Prevention Measures Documented      [x] Yes- Altered Skin Integrity Present or Discovered   [] LDA Added if Not in Epic (Describe Wound)   [x] New Altered Skin Integrity was Present on Admit and Documented in LDA   [] Wound Image Taken    Wound Care Consulted? No, surgical incision    Attending Nurse:  Chandrika Hernandez RN     Second RN/Staff Member:  Gaby GUERRERO

## 2023-06-05 NOTE — OP NOTE
[facility]  Surgery Department  Operative Note    SUMMARY     Date of Procedure: 6/5/2023     Procedure: Procedure(s) (LRB):  ENDARTERECTOMY, CAROTID (Left)     Surgeon(s) and Role:     * Frank Rey MD - Primary    Assisting Surgeon: None    Pre-Operative Diagnosis: Bilateral carotid artery stenosis [I65.23]    Post-Operative Diagnosis: Post-Op Diagnosis Codes:     * Bilateral carotid artery stenosis [I65.23]    Anesthesia: General    Operative Findings (including complications, if any):  Bilateral carotid stenosis    Description of Technical Procedures:  Left carotid endarterectomy    Skin incision made anterior to sternocleidomastoid dissection down through subcutaneous tissue achieved with hemo cautery.  Sternocleidomastoid dissected along its anterior border retracted laterally internal jugular vein was identified facial vein was ligated and divided.  Internal jugular vein was retracted laterally.  Common carotid artery was dissected out looped proximally leaving the vagus nerve posterior laterally within the carotid sheath.  Common carotid was dissected out distally bifurcation areolar tissue injected with 1% plain lidocaine.  The external carotid was looped.  The internal carotid was dissected out distally the hypoglossal nerve was mobilized cephalad and medial the internal carotid was looped distal to the plaque.  Patient received 7500 units of IV heparin.  After 5 minutes the internal carotid was occluded 1st and the common and external carotid arteries were occluded.  Arteriotomy was made with the 11 blade and lengthened with Puentes scissors across the bifurcation going distal to the plaque.  There were no changes on EEG monitoring.  Endarterectomy was started proximally with a [place] elevator the intima was transected proximally the Puentes scissors distally the endpoint further with the assistance of the [place] blade.  The external carotid underwent eversion endarterectomy.  The lumen was  cleared of any debris.  Distal endpoint was well adherent.  Bovine pericardium patch angioplasty was performed with 6 0 and 7 0 Prolene sutures.  Prior to completion of the closure the vessels were allowed to flush and backbleed.  The closure was completed the internal was allowed to back bleed 1st to the of the lumen and then the internal was clamped at its origin the common and external carotid were opened everything was lot of flush down the external carotid after several beats the internal was reopened and hemostasis was achieved with Surgicel and 75 mg of protamine.  Wounds irrigated irrigated out close the deep layer 3-0 Vicryl and 4-0 Monocryl subcuticular.    Significant Surgical Tasks Conducted by the Assistant(s), if Applicable:  Assisted with all dissection and anastomosis    Estimated Blood Loss (EBL): * No values recorded between 6/5/2023 12:00 AM and 6/5/2023  9:29 AM *           Implants: * No implants in log *    Specimens:   Specimen (24h ago, onward)      None                    Condition: Good    Disposition: ICU - extubated and stable.    Attestation: I was present and scrubbed for the entire procedure.

## 2023-06-05 NOTE — H&P (VIEW-ONLY)
[facility]  History & Physical  Vascular Surgery    SUBJECTIVE:     Chief Complaint/Reason for Admission:  Bilateral carotid stenosis    History of Present Illness:  Patient is a 68 y.o. male presents with had complaints of hearing his heartbeat in his ear.  Underwent ultrasound which was found to have an elevated velocity of 252 on the left.  He then had a CTA which shows 90% bilateral internal carotid artery stenosis.  He is not had any CVAs or strokes.  Nondiabetic.  He is right-handed..  PTA Medications   Medication Sig    amLODIPine-benazepriL (LOTREL) 10-40 mg per capsule Take 1 capsule by mouth once daily.    ascorbic acid, vitamin C, (VITAMIN C) 1000 MG tablet Take 1,000 mg by mouth once daily.    atorvastatin (LIPITOR) 10 MG tablet Take 1 tablet (10 mg total) by mouth once daily.    multivitamin capsule Take 1 capsule by mouth once daily.    tamsulosin (FLOMAX) 0.4 mg Cap Take 0.4 mg by mouth 2 (two) times a day.    aspirin (ECOTRIN) 81 MG EC tablet Take 81 mg by mouth once daily.       Review of patient's allergies indicates:  No Known Allergies    Past Medical History:   Diagnosis Date    Carotid stenosis, left     Hypertension      No past surgical history on file.  Family History   Problem Relation Age of Onset    Diabetes Mother     Kidney failure Mother          at 82    Heart failure Father          in the late 50's.     Coronary artery disease Brother         stent placed, DM II, alive at 70     Social History     Tobacco Use    Smoking status: Every Day     Packs/day: 0.50     Years: 40.00     Pack years: 20.00     Types: Cigarettes    Smokeless tobacco: Never    Tobacco comments:     Advised not to smoke DOS   Substance Use Topics    Alcohol use: Yes     Alcohol/week: 3.0 standard drinks     Types: 3 Cans of beer per week     Comment: nightly         Review of Systems:  Peripheral Vascular: Leg Claudication: none  ENT: no nasal congestion or sore throat  Cardiovascular: no  chest pain or palpitations  Neurological: no seizures or tremors    OBJECTIVE:     Vital Signs (Most Recent):       Physical Exam:  Neck: supple, symmetrical, trachea midline and normal findings: carotids 4/4 without bruits  Lungs:  clear to auscultation bilaterally and normal respiratory effort  Heart: regular rate and rhythm, S1, S2 normal, no murmur, rub or gallop  Abdomen: soft, non-tender non-distented; bowel sounds normal; no masses,  no organomegaly  Pulses: FEM: present 2+ and PT: doppler    Laboratory:  I have reviewed all pertinent lab results within the past 24 hours.  CBC: No results for input(s): WBC, RBC, HGB, HCT, PLT, MCV, MCH, MCHC in the last 168 hours.  BMP: No results for input(s): GLU, NA, K, CL, CO2, BUN, CREATININE, CALCIUM, MG in the last 168 hours.    Diagnostic Results:  CTA reviewed    ASSESSMENT/PLAN:     Bilateral carotid stenosis    Left carotid endarterectomy

## 2023-06-05 NOTE — ANESTHESIA POSTPROCEDURE EVALUATION
Anesthesia Post Evaluation    Patient: Koffi Merritt    Procedure(s) Performed: Procedure(s) (LRB):  ENDARTERECTOMY, CAROTID (Left)    Final Anesthesia Type: general      Patient location during evaluation: PACU  Patient participation: Yes- Able to Participate  Level of consciousness: awake and alert  Post-procedure vital signs: reviewed and stable  Pain management: adequate  Airway patency: patent    PONV status at discharge: No PONV  Anesthetic complications: no      Cardiovascular status: hemodynamically stable  Respiratory status: unassisted, spontaneous ventilation and room air  Hydration status: euvolemic  Follow-up not needed.          Vitals Value Taken Time   /60 06/05/23 1230   Temp  06/05/23 1238   Pulse 59 06/05/23 1236   Resp 8 06/05/23 1236   SpO2 99 % 06/05/23 1236   Vitals shown include unvalidated device data.      No case tracking events are documented in the log.      Pain/Mingo Score: Pain Rating Prior to Med Admin: 5 (6/5/2023 12:20 PM)  Mingo Score: 8 (6/5/2023 11:39 AM)

## 2023-06-05 NOTE — ANESTHESIA PREPROCEDURE EVALUATION
06/05/2023  Koffi Merritt is a 68 y.o., male.      Patient Active Problem List   Diagnosis    Left carotid stenosis    Stenosis of right vertebral artery    Smoker    Mixed hyperlipidemia    Primary hypertension       History reviewed. No pertinent surgical history.     Tobacco Use:  The patient  reports that he has been smoking cigarettes. He has a 20.00 pack-year smoking history. He has never used smokeless tobacco.     Results for orders placed or performed during the hospital encounter of 05/22/23   EKG 12-lead    Collection Time: 05/22/23  1:20 PM    Narrative    Test Reason : Z01.818,    Vent. Rate : 069 BPM     Atrial Rate : 069 BPM     P-R Int : 168 ms          QRS Dur : 080 ms      QT Int : 362 ms       P-R-T Axes : 083 084 076 degrees     QTc Int : 387 ms    Normal sinus rhythm  Normal ECG  No previous ECGs available  Confirmed by Chao WAYNE, Neri GOODMAN ([id number]) on 5/24/2023 7:09:45 PM    Referred By:             Confirmed By:Neri Guidry MD             Lab Results   Component Value Date    WBC 9.39 05/22/2023    HGB 12.3 (L) 05/22/2023    HCT 36.2 (L) 05/22/2023    MCV 90 05/22/2023     05/22/2023     BMP  Lab Results   Component Value Date     05/22/2023    K 5.1 05/22/2023     05/22/2023    CO2 25 05/22/2023    BUN 22 05/22/2023    CREATININE 1.7 (H) 05/22/2023    CALCIUM 9.6 05/22/2023    ANIONGAP 6 (L) 05/22/2023    GLU 86 05/22/2023    GLU 92 02/23/2023    GLU 95 01/12/2023       No results found for this or any previous visit.              Pre-op Assessment    I have reviewed the Patient Summary Reports.     I have reviewed the Nursing Notes. I have reviewed the NPO Status.   I have reviewed the Medications.     Review of Systems  Anesthesia Hx:  No problems with previous Anesthesia  Denies Family Hx of Anesthesia complications.   Denies Personal Hx of Anesthesia  complications.   Social:  Smoker    Hematology/Oncology:  Hematology Normal        Cardiovascular:   Hypertension, well controlled hyperlipidemia ECG has been reviewed. Bilateral carotid occlusive disease 70-80%  R vertebral artery stenosis   Pulmonary:  Pulmonary Normal    Hepatic/GI:  Hepatic/GI Normal    Musculoskeletal:  Musculoskeletal Normal    Neurological:  Neurology Normal    Endocrine:  Endocrine Normal        Physical Exam  General: Well nourished, Cooperative, Alert and Oriented    Airway:  Mallampati: III / II  Mouth Opening: Normal  TM Distance: Normal  Tongue: Normal  Neck ROM: Normal ROM    Chest/Lungs:  Clear to auscultation    Heart:  Rate: Normal  Rhythm: Regular Rhythm  Sounds: Normal    Abdomen:  Normal, Soft, Nontender        Anesthesia Plan  Type of Anesthesia, risks & benefits discussed:    Anesthesia Type: Gen ETT  Intra-op Monitoring Plan: Standard ASA Monitors and Art Line  Post Op Pain Control Plan: multimodal analgesia and IV/PO Opioids PRN  Induction:  IV  Airway Plan: Video, Post-Induction  Informed Consent: Informed consent signed with the Patient and all parties understand the risks and agree with anesthesia plan.  All questions answered.   ASA Score: 3  Anesthesia Plan Notes:   GETA  Iv tylenol  PIV x 2   A line  Zofran Decadron Pepcid    Ready For Surgery From Anesthesia Perspective.     .

## 2023-06-05 NOTE — PLAN OF CARE
Arrived to PACU s/p left carotid endarterectomy.  Dressing in place with exofin and mepilex.  Pt arouses to verbal.  Pain rated at 10/10 to left lateral neck.  Has equal hand grasps and facial symmetry when smiling and raising eyebrows.  A-line to right radial with good wave form.

## 2023-06-06 VITALS
BODY MASS INDEX: 22.82 KG/M2 | HEIGHT: 68 IN | RESPIRATION RATE: 16 BRPM | DIASTOLIC BLOOD PRESSURE: 60 MMHG | SYSTOLIC BLOOD PRESSURE: 138 MMHG | OXYGEN SATURATION: 98 % | HEART RATE: 84 BPM | TEMPERATURE: 98 F | WEIGHT: 150.56 LBS

## 2023-06-06 LAB
ANION GAP SERPL CALC-SCNC: 5 MMOL/L (ref 8–16)
BUN SERPL-MCNC: 26 MG/DL (ref 8–23)
CALCIUM SERPL-MCNC: 9 MG/DL (ref 8.7–10.5)
CHLORIDE SERPL-SCNC: 110 MMOL/L (ref 95–110)
CO2 SERPL-SCNC: 22 MMOL/L (ref 23–29)
CREAT SERPL-MCNC: 1.5 MG/DL (ref 0.5–1.4)
ERYTHROCYTE [DISTWIDTH] IN BLOOD BY AUTOMATED COUNT: 15.4 % (ref 11.5–14.5)
EST. GFR  (NO RACE VARIABLE): 49.8 ML/MIN/1.73 M^2
GLUCOSE SERPL-MCNC: 120 MG/DL (ref 70–110)
HCT VFR BLD AUTO: 31.2 % (ref 40–54)
HGB BLD-MCNC: 10.9 G/DL (ref 14–18)
MAGNESIUM SERPL-MCNC: 1.9 MG/DL (ref 1.6–2.6)
MCH RBC QN AUTO: 30.8 PG (ref 27–31)
MCHC RBC AUTO-ENTMCNC: 34.9 G/DL (ref 32–36)
MCV RBC AUTO: 88 FL (ref 82–98)
PLATELET # BLD AUTO: 217 K/UL (ref 150–450)
PMV BLD AUTO: 10.6 FL (ref 9.2–12.9)
POTASSIUM SERPL-SCNC: 4.2 MMOL/L (ref 3.5–5.1)
RBC # BLD AUTO: 3.54 M/UL (ref 4.6–6.2)
SODIUM SERPL-SCNC: 137 MMOL/L (ref 136–145)
WBC # BLD AUTO: 15.26 K/UL (ref 3.9–12.7)

## 2023-06-06 PROCEDURE — 94761 N-INVAS EAR/PLS OXIMETRY MLT: CPT

## 2023-06-06 PROCEDURE — 63600175 PHARM REV CODE 636 W HCPCS: Performed by: SURGERY

## 2023-06-06 PROCEDURE — 83735 ASSAY OF MAGNESIUM: CPT | Performed by: SURGERY

## 2023-06-06 PROCEDURE — 25000003 PHARM REV CODE 250: Performed by: SURGERY

## 2023-06-06 PROCEDURE — 80048 BASIC METABOLIC PNL TOTAL CA: CPT | Performed by: SURGERY

## 2023-06-06 PROCEDURE — 85027 COMPLETE CBC AUTOMATED: CPT | Performed by: SURGERY

## 2023-06-06 RX ADMIN — CEFAZOLIN SODIUM 2 G: 2 SOLUTION INTRAVENOUS at 01:06

## 2023-06-06 RX ADMIN — HYDROCODONE BITARTRATE AND ACETAMINOPHEN 1 TABLET: 5; 325 TABLET ORAL at 04:06

## 2023-06-06 RX ADMIN — HYDROCODONE BITARTRATE AND ACETAMINOPHEN 1 TABLET: 5; 325 TABLET ORAL at 12:06

## 2023-06-06 NOTE — NURSING
Pt lying in bed in no acute distress. Repositioned pt. IV drip infusing per md order. See ongoing assessment and charted vital signs in epic. See telemetry strip in chart. Pt is AAOx3. PERRLA. Hand grasps strong and equal with release. Lifts and resists bilateral upper and lower extremities. No drift. Follows commands. Wiggles toes, sticks out tongue and gives thumbs up. Call light within reach and bed alarm on. Will continue to monitor.

## 2023-06-06 NOTE — NURSING
Pt lying in bed in no acute distress. Spoke to Dr. Rey concerning pt.'s status. See new orders in [facility] for discharge today. Discontinued IV fluids per md order. Notified pt of pending discharge. Pt states understanding.

## 2023-06-06 NOTE — CARE UPDATE
06/05/23 2002   Patient Assessment/Suction   Level of Consciousness (AVPU) alert   Respiratory Effort Unlabored   Expansion/Accessory Muscles/Retractions expansion symmetric   All Lung Fields Breath Sounds clear   Rhythm/Pattern, Respiratory depth regular;pattern regular   Cough Frequency infrequent   Cough Type good;nonproductive   PRE-TX-O2   Device (Oxygen Therapy) nasal cannula   $ Is the patient on Low Flow Oxygen? Yes   Flow (L/min) 2   SpO2 98 %   Pulse Oximetry Type Continuous   $ Pulse Oximetry - Multiple Charge Pulse Oximetry - Multiple   Pulse (!) 54   Resp 15   Education   $ Education DME Oxygen;15 min

## 2023-06-07 NOTE — PLAN OF CARE
06/07/23 1031   Final Note   Assessment Type Final Discharge Note   Anticipated Discharge Disposition Home   Post-Acute Status   Post-Acute Authorization Other   Other Status No Post-Acute Service Needs   Discharge Delays None known at this time

## 2023-07-24 ENCOUNTER — TELEPHONE (OUTPATIENT)
Dept: FAMILY MEDICINE | Facility: CLINIC | Age: 68
End: 2023-07-24

## 2023-07-24 RX ORDER — AMLODIPINE AND BENAZEPRIL HYDROCHLORIDE 10; 40 MG/1; MG/1
1 CAPSULE ORAL DAILY
Qty: 30 CAPSULE | Refills: 0 | Status: SHIPPED | OUTPATIENT
Start: 2023-07-24 | End: 2023-08-21 | Stop reason: SDUPTHER

## 2023-07-24 NOTE — TELEPHONE ENCOUNTER
----- Message from Whit Linton sent at 7/24/2023  9:40 AM CDT -----  Refill for amlodipine. [facility] [address]. Pt #[phone]

## 2023-07-24 NOTE — TELEPHONE ENCOUNTER
----- Message from Karine Odell sent at 7/24/2023  2:11 PM CDT -----  The patient got Amlodipine-Benazepril 10/40 mg 1 a day from his last PCP. He needs a new prescription [facility] [address]. Pt's # [phone] GH

## 2023-08-21 RX ORDER — AMLODIPINE AND BENAZEPRIL HYDROCHLORIDE 10; 40 MG/1; MG/1
1 CAPSULE ORAL DAILY
Qty: 30 CAPSULE | Refills: 0 | Status: SHIPPED | OUTPATIENT
Start: 2023-08-21 | End: 2023-09-19 | Stop reason: SDUPTHER

## 2023-08-21 NOTE — TELEPHONE ENCOUNTER
----- Message from Lesly Mendoza sent at 8/21/2023 10:59 AM CDT -----  Patient called and stated that he need a refill of his amlodipine-benazepril [facility] [address] if any questions please give him a call at  [phone]

## 2023-09-19 ENCOUNTER — TELEPHONE (OUTPATIENT)
Dept: FAMILY MEDICINE | Facility: CLINIC | Age: 68
End: 2023-09-19

## 2023-09-19 RX ORDER — AMLODIPINE AND BENAZEPRIL HYDROCHLORIDE 10; 40 MG/1; MG/1
1 CAPSULE ORAL DAILY
Qty: 30 CAPSULE | Refills: 0 | Status: SHIPPED | OUTPATIENT
Start: 2023-09-19 | End: 2023-10-19 | Stop reason: SDUPTHER

## 2023-09-19 NOTE — TELEPHONE ENCOUNTER
----- Message from Laurie Sauceda sent at 9/19/2023 12:53 PM CDT -----  Pts refill needs a prior authorization Amlodipine [phone]

## 2023-10-17 ENCOUNTER — TELEPHONE (OUTPATIENT)
Dept: FAMILY MEDICINE | Facility: CLINIC | Age: 68
End: 2023-10-17

## 2023-10-19 DIAGNOSIS — I65.23 BILATERAL CAROTID ARTERY STENOSIS: Primary | ICD-10-CM

## 2023-10-19 RX ORDER — AMLODIPINE AND BENAZEPRIL HYDROCHLORIDE 10; 40 MG/1; MG/1
1 CAPSULE ORAL DAILY
Qty: 30 CAPSULE | Refills: 0 | Status: SHIPPED | OUTPATIENT
Start: 2023-10-19 | End: 2023-11-07 | Stop reason: SDUPTHER

## 2023-10-19 NOTE — TELEPHONE ENCOUNTER
Spoke with patient who states he only has 3 tablets left. Aware he needs an appointment. 30 days supply set up, jerome scheduled.

## 2023-10-19 NOTE — TELEPHONE ENCOUNTER
----- Message from Tanya Lugo sent at 10/19/2023 10:18 AM CDT -----  Pt needs refill on amlodipine   [facility] [address]   [phone]

## 2023-11-07 ENCOUNTER — OFFICE VISIT (OUTPATIENT)
Dept: FAMILY MEDICINE | Facility: CLINIC | Age: 68
End: 2023-11-07
Attending: FAMILY MEDICINE
Payer: MEDICARE

## 2023-11-07 ENCOUNTER — HOSPITAL ENCOUNTER (OUTPATIENT)
Dept: RADIOLOGY | Facility: HOSPITAL | Age: 68
Discharge: HOME | End: 2023-11-07
Attending: FAMILY MEDICINE
Payer: MEDICARE

## 2023-11-07 VITALS
HEART RATE: 98 BPM | WEIGHT: 152 LBS | HEIGHT: 68 IN | DIASTOLIC BLOOD PRESSURE: 66 MMHG | BODY MASS INDEX: 23.04 KG/M2 | SYSTOLIC BLOOD PRESSURE: 128 MMHG

## 2023-11-07 DIAGNOSIS — S16.1XXA STRAIN OF NECK MUSCLE, INITIAL ENCOUNTER: Primary | ICD-10-CM

## 2023-11-07 DIAGNOSIS — I65.22 LEFT CAROTID STENOSIS: ICD-10-CM

## 2023-11-07 DIAGNOSIS — I10 PRIMARY HYPERTENSION: ICD-10-CM

## 2023-11-07 DIAGNOSIS — F17.200 SMOKER: ICD-10-CM

## 2023-11-07 DIAGNOSIS — E78.2 MIXED HYPERLIPIDEMIA: ICD-10-CM

## 2023-11-07 DIAGNOSIS — I65.21 RIGHT-SIDED EXTRACRANIAL CAROTID ARTERY STENOSIS: ICD-10-CM

## 2023-11-07 DIAGNOSIS — I65.01 STENOSIS OF RIGHT VERTEBRAL ARTERY: ICD-10-CM

## 2023-11-07 PROCEDURE — 3078F DIAST BP <80 MM HG: CPT | Mod: CPTII,S$GLB,, | Performed by: FAMILY MEDICINE

## 2023-11-07 PROCEDURE — 3008F BODY MASS INDEX DOCD: CPT | Mod: CPTII,S$GLB,, | Performed by: FAMILY MEDICINE

## 2023-11-07 PROCEDURE — 72050 X-RAY EXAM NECK SPINE 4/5VWS: CPT | Mod: TC,PO

## 2023-11-07 PROCEDURE — 3074F SYST BP LT 130 MM HG: CPT | Mod: CPTII,S$GLB,, | Performed by: FAMILY MEDICINE

## 2023-11-07 PROCEDURE — 3078F PR MOST RECENT DIASTOLIC BLOOD PRESSURE < 80 MM HG: ICD-10-PCS | Mod: CPTII,S$GLB,, | Performed by: FAMILY MEDICINE

## 2023-11-07 PROCEDURE — 3062F PR POS MACROALBUMINURIA RESULT DOCUMENTED/REVIEW: ICD-10-PCS | Mod: CPTII,S$GLB,, | Performed by: FAMILY MEDICINE

## 2023-11-07 PROCEDURE — 4010F PR ACE/ARB THEARPY RXD/TAKEN: ICD-10-PCS | Mod: CPTII,S$GLB,, | Performed by: FAMILY MEDICINE

## 2023-11-07 PROCEDURE — 3008F PR BODY MASS INDEX (BMI) DOCUMENTED: ICD-10-PCS | Mod: CPTII,S$GLB,, | Performed by: FAMILY MEDICINE

## 2023-11-07 PROCEDURE — 1159F PR MEDICATION LIST DOCUMENTED IN MEDICAL RECORD: ICD-10-PCS | Mod: CPTII,S$GLB,, | Performed by: FAMILY MEDICINE

## 2023-11-07 PROCEDURE — 3074F PR MOST RECENT SYSTOLIC BLOOD PRESSURE < 130 MM HG: ICD-10-PCS | Mod: CPTII,S$GLB,, | Performed by: FAMILY MEDICINE

## 2023-11-07 PROCEDURE — 1159F MED LIST DOCD IN RCRD: CPT | Mod: CPTII,S$GLB,, | Performed by: FAMILY MEDICINE

## 2023-11-07 PROCEDURE — 3062F POS MACROALBUMINURIA REV: CPT | Mod: CPTII,S$GLB,, | Performed by: FAMILY MEDICINE

## 2023-11-07 PROCEDURE — 99214 OFFICE O/P EST MOD 30 MIN: CPT | Mod: S$GLB,,, | Performed by: FAMILY MEDICINE

## 2023-11-07 PROCEDURE — 3066F NEPHROPATHY DOC TX: CPT | Mod: CPTII,S$GLB,, | Performed by: FAMILY MEDICINE

## 2023-11-07 PROCEDURE — 3288F PR FALLS RISK ASSESSMENT DOCUMENTED: ICD-10-PCS | Mod: CPTII,S$GLB,, | Performed by: FAMILY MEDICINE

## 2023-11-07 PROCEDURE — 1101F PT FALLS ASSESS-DOCD LE1/YR: CPT | Mod: CPTII,S$GLB,, | Performed by: FAMILY MEDICINE

## 2023-11-07 PROCEDURE — 99214 PR OFFICE/OUTPT VISIT, EST, LEVL IV, 30-39 MIN: ICD-10-PCS | Mod: S$GLB,,, | Performed by: FAMILY MEDICINE

## 2023-11-07 PROCEDURE — 1101F PR PT FALLS ASSESS DOC 0-1 FALLS W/OUT INJ PAST YR: ICD-10-PCS | Mod: CPTII,S$GLB,, | Performed by: FAMILY MEDICINE

## 2023-11-07 PROCEDURE — 3288F FALL RISK ASSESSMENT DOCD: CPT | Mod: CPTII,S$GLB,, | Performed by: FAMILY MEDICINE

## 2023-11-07 PROCEDURE — 3066F PR DOCUMENTATION OF TREATMENT FOR NEPHROPATHY: ICD-10-PCS | Mod: CPTII,S$GLB,, | Performed by: FAMILY MEDICINE

## 2023-11-07 PROCEDURE — 4010F ACE/ARB THERAPY RXD/TAKEN: CPT | Mod: CPTII,S$GLB,, | Performed by: FAMILY MEDICINE

## 2023-11-07 RX ORDER — METHYLPREDNISOLONE 4 MG/1
TABLET ORAL
Qty: 1 EACH | Refills: 0 | Status: SHIPPED | OUTPATIENT
Start: 2023-11-07 | End: 2023-11-28

## 2023-11-07 RX ORDER — AMLODIPINE AND BENAZEPRIL HYDROCHLORIDE 10; 40 MG/1; MG/1
1 CAPSULE ORAL DAILY
Qty: 90 CAPSULE | Refills: 3 | Status: SHIPPED | OUTPATIENT
Start: 2023-11-07

## 2023-11-07 RX ORDER — TIZANIDINE 4 MG/1
4 TABLET ORAL NIGHTLY
Qty: 20 TABLET | Refills: 0 | Status: SHIPPED | OUTPATIENT
Start: 2023-11-07 | End: 2023-11-17

## 2023-11-07 NOTE — PROGRESS NOTES
SUBJECTIVE:    Patient ID: Koffi Merritt is a 68 y.o. male.    Chief Complaint: Hypertension (Brought bottles,, declined flu vaccine, discuss about medications (side effects),Headache left side for months, abc )    This 68-year-old male complains of left-sided neck pain times 2-3 weeks.  He denies any trauma or heavy manual labor.  No heavy lifting reported.  He states he hurts in the left posterior neck area with no radiation down into the shoulders or arms.  Advil p.r.n. seems to help the pain.    Carotid stenosis bilaterally.  In the past he had 80% bilateral carotid stenoses.  In June 2023 he had a left carotid endarterectomy done by Dr. Frank Rey.  He is doing well and is scheduled have an ultrasound again by Dr. Rey to re-evaluate both carotids.  Currently maintained on amlodipine/benazepril tablets daily and atorvastatin 10 mg daily.  No lipid panel done lately.  CTA of the carotids a year ago showed 70% right vertebral stenosis 80% bilateral left internal and right internal carotid arteries    Small sebaceous cyst was on the nape of his neck.  His wife drained it and express some contents.  It is now smaller.        Admission on 06/05/2023, Discharged on 06/06/2023   Component Date Value Ref Range Status    Group & Rh 06/05/2023 O POS   Final    Indirect Jim 06/05/2023 NEG   Final    POC ACTIVATED CLOTTING TIME K 06/05/2023 143 (H)  74 - 137 sec Final    Sample 06/05/2023 ARTERIAL   Final    POC ACTIVATED CLOTTING TIME K 06/05/2023 341 (H)  74 - 137 sec Final    Sample 06/05/2023 unknown   Final    POC ACTIVATED CLOTTING TIME K 06/05/2023 281 (H)  74 - 137 sec Final    Sample 06/05/2023 ARTERIAL   Final    POC ACTIVATED CLOTTING TIME K 06/05/2023 137  74 - 137 sec Final    Sample 06/05/2023 ARTERIAL   Final    WBC 06/06/2023 15.26 (H)  3.90 - 12.70 K/uL Final    RBC 06/06/2023 3.54 (L)  4.60 - 6.20 M/uL Final    Hemoglobin 06/06/2023 10.9 (L)  14.0 - 18.0 g/dL Final    Hematocrit 06/06/2023 31.2 (L)   40.0 - 54.0 % Final    MCV 06/06/2023 88  82 - 98 fL Final    MCH 06/06/2023 30.8  27.0 - 31.0 pg Final    MCHC 06/06/2023 34.9  32.0 - 36.0 g/dL Final    RDW 06/06/2023 15.4 (H)  11.5 - 14.5 % Final    Platelets 06/06/2023 217  150 - 450 K/uL Final    MPV 06/06/2023 10.6  9.2 - 12.9 fL Final    Sodium 06/06/2023 137  136 - 145 mmol/L Final    Potassium 06/06/2023 4.2  3.5 - 5.1 mmol/L Final    Chloride 06/06/2023 110  95 - 110 mmol/L Final    CO2 06/06/2023 22 (L)  23 - 29 mmol/L Final    Glucose 06/06/2023 120 (H)  70 - 110 mg/dL Final    BUN 06/06/2023 26 (H)  8 - 23 mg/dL Final    Creatinine 06/06/2023 1.5 (H)  0.5 - 1.4 mg/dL Final    Calcium 06/06/2023 9.0  8.7 - 10.5 mg/dL Final    Anion Gap 06/06/2023 5 (L)  8 - 16 mmol/L Final    eGFR 06/06/2023 49.8 (A)  >60 mL/min/1.73 m^2 Final    Magnesium 06/06/2023 1.9  1.6 - 2.6 mg/dL Final   Lab Visit on 05/22/2023   Component Date Value Ref Range Status    WBC 05/22/2023 9.39  3.90 - 12.70 K/uL Final    RBC 05/22/2023 4.03 (L)  4.60 - 6.20 M/uL Final    Hemoglobin 05/22/2023 12.3 (L)  14.0 - 18.0 g/dL Final    Hematocrit 05/22/2023 36.2 (L)  40.0 - 54.0 % Final    MCV 05/22/2023 90  82 - 98 fL Final    MCH 05/22/2023 30.5  27.0 - 31.0 pg Final    MCHC 05/22/2023 34.0  32.0 - 36.0 g/dL Final    RDW 05/22/2023 15.9 (H)  11.5 - 14.5 % Final    Platelets 05/22/2023 227  150 - 450 K/uL Final    MPV 05/22/2023 9.9  9.2 - 12.9 fL Final    Sodium 05/22/2023 140  136 - 145 mmol/L Final    Potassium 05/22/2023 5.1  3.5 - 5.1 mmol/L Final    Chloride 05/22/2023 109  95 - 110 mmol/L Final    CO2 05/22/2023 25  23 - 29 mmol/L Final    Glucose 05/22/2023 86  70 - 110 mg/dL Final    BUN 05/22/2023 22  8 - 23 mg/dL Final    Creatinine 05/22/2023 1.7 (H)  0.5 - 1.4 mg/dL Final    Calcium 05/22/2023 9.6  8.7 - 10.5 mg/dL Final    Anion Gap 05/22/2023 6 (L)  8 - 16 mmol/L Final    eGFR 05/22/2023 42.8 (A)  >60 mL/min/1.73 m^2 Final    aPTT 05/22/2023 25.5  21.0 - 32.0 sec Final     Prothrombin Time 2023 10.0  9.0 - 12.5 sec Final    INR 2023 0.9  0.8 - 1.2 Final       Past Medical History:   Diagnosis Date    Carotid stenosis, left     Hypertension      Social History     Socioeconomic History    Marital status:     Number of children: 5   Occupational History    Occupation: Retired , worked at [employer] in [employer]ing    Tobacco Use    Smoking status: Every Day     Current packs/day: 0.50     Average packs/day: 0.5 packs/day for 40.0 years (20.0 ttl pk-yrs)     Types: Cigarettes    Smokeless tobacco: Never    Tobacco comments:     Advised not to smoke DOS   Substance and Sexual Activity    Alcohol use: Yes     Alcohol/week: 3.0 standard drinks of alcohol     Types: 3 Cans of beer per week     Comment: nightly      Past Surgical History:   Procedure Laterality Date    CAROTID ENDARTERECTOMY Left 2023    Procedure: ENDARTERECTOMY, CAROTID;  Surgeon: Frank Rey MD;  Location: [facility];  Service: General;  Laterality: Left;  EEG!     Family History   Problem Relation Age of Onset    Diabetes Mother     Kidney failure Mother          at 82    Heart failure Father          in the late 50's.     Coronary artery disease Brother         stent placed, DM II, alive at 70       The 10-year CVD risk score (D'Agostino, et al., 2008) is: 34.2%    Values used to calculate the score:      Age: 68 years      Sex: Male      Diabetic: No      Tobacco smoker: Yes      Systolic Blood Pressure: 128 mmHg      Is BP treated: Yes      HDL Cholesterol: 72 mg/dL      Total Cholesterol: 188 mg/dL    Tests to Keep You Healthy    Colon Cancer Screening: Met on 2023  Last Blood Pressure <= 139/89 (2023): Yes  Tobacco Cessation: NO      Review of patient's allergies indicates:  No Known Allergies    Current Outpatient Medications:     ascorbic acid, vitamin C, (VITAMIN C) 1000 MG tablet, Take 1,000 mg by mouth once daily., Disp: , Rfl:     aspirin (ECOTRIN) 81 MG EC tablet, Take  "81 mg by mouth once daily., Disp: , Rfl:     atorvastatin (LIPITOR) 10 MG tablet, Take 1 tablet (10 mg total) by mouth once daily., Disp: 90 tablet, Rfl: 3    multivitamin capsule, Take 1 capsule by mouth once daily., Disp: , Rfl:     tamsulosin (FLOMAX) 0.4 mg Cap, Take 0.4 mg by mouth 2 (two) times a day., Disp: , Rfl:     amLODIPine-benazepriL (LOTREL) 10-40 mg per capsule, Take 1 capsule by mouth once daily., Disp: 90 capsule, Rfl: 3    methylPREDNISolone (MEDROL DOSEPACK) 4 mg tablet, use as directed, Disp: 1 each, Rfl: 0    tiZANidine (ZANAFLEX) 4 MG tablet, Take 1 tablet (4 mg total) by mouth every evening. for 10 days, Disp: 20 tablet, Rfl: 0    Review of Systems   Constitutional:  Negative for appetite change, chills, fatigue, fever and unexpected weight change.   HENT:  Negative for ear pain and trouble swallowing.    Eyes:  Negative for pain, discharge and visual disturbance.   Respiratory:  Negative for apnea, cough, shortness of breath and wheezing.    Cardiovascular:  Negative for chest pain and leg swelling.   Gastrointestinal:  Negative for abdominal pain, blood in stool, constipation, diarrhea, nausea, vomiting and reflux.   Endocrine: Negative for cold intolerance, heat intolerance and polydipsia.   Genitourinary:  Negative for bladder incontinence, dysuria, erectile dysfunction, frequency, hematuria, testicular pain and urgency.   Musculoskeletal:  Positive for neck pain and neck stiffness. Negative for gait problem, joint swelling and myalgias.   Neurological:  Negative for dizziness, seizures and numbness.   Psychiatric/Behavioral:  Negative for agitation, behavioral problems and hallucinations. The patient is not nervous/anxious.            Objective:      Vitals:    11/07/23 1340   BP: 128/66   Pulse: 98   Weight: 68.9 kg (152 lb)   Height: 5' 8" (1.727 m)     Physical Exam  Vitals and nursing note reviewed.   Constitutional:       General: He is not in acute distress.     Appearance: Normal " appearance. He is well-developed. He is not toxic-appearing.   HENT:      Head: Normocephalic and atraumatic.      Right Ear: Tympanic membrane and external ear normal.      Left Ear: Tympanic membrane and external ear normal.      Nose: Nose normal.      Mouth/Throat:      Pharynx: Oropharynx is clear.   Eyes:      Pupils: Pupils are equal, round, and reactive to light.   Neck:      Thyroid: No thyromegaly.      Vascular: Carotid bruit (Left carotid bruit is mild) present.      Comments: Tender to palpation the left occipital cervical junction.  Neck has 60 degree rotation right and left.  Flexion extension is normal.  Cardiovascular:      Rate and Rhythm: Normal rate and regular rhythm.      Heart sounds: Normal heart sounds. No murmur heard.  Pulmonary:      Effort: Pulmonary effort is normal.      Breath sounds: Normal breath sounds. No wheezing or rales.   Abdominal:      General: Bowel sounds are normal. There is no distension.      Palpations: Abdomen is soft.      Tenderness: There is no abdominal tenderness.   Musculoskeletal:         General: No tenderness or deformity. Normal range of motion.      Cervical back: Normal range of motion and neck supple.      Lumbar back: Normal. No spasms.      Comments: Bends 90 degrees at  waist   Lymphadenopathy:      Cervical: No cervical adenopathy.   Skin:     General: Skin is warm and dry.      Findings: No rash.   Neurological:      Mental Status: He is alert and oriented to person, place, and time. Mental status is at baseline.      Cranial Nerves: No cranial nerve deficit.      Coordination: Coordination normal.      Gait: Gait normal.   Psychiatric:         Mood and Affect: Mood normal.         Behavior: Behavior normal.         Thought Content: Thought content normal.         Judgment: Judgment normal.           Assessment:       1. Strain of neck muscle, initial encounter    2. Primary hypertension    3. Mixed hyperlipidemia    4. Stenosis of right vertebral  artery    5. Left carotid stenosis    6. Smoker    7. Right-sided extracranial carotid artery stenosis         Plan:       Strain of neck muscle, initial encounter  -     tiZANidine (ZANAFLEX) 4 MG tablet; Take 1 tablet (4 mg total) by mouth every evening. for 10 days  Dispense: 20 tablet; Refill: 0  -     methylPREDNISolone (MEDROL DOSEPACK) 4 mg tablet; use as directed  Dispense: 1 each; Refill: 0  Will get a C-spine x-ray to evaluate his neck pain.  Also add mild muscle relaxer tizanidine 4 mg HS and Medrol Dosepak.  Continue Advil as needed for pain relief.  Alternate ice and heat packs  Primary hypertension  -     amLODIPine-benazepriL (LOTREL) 10-40 mg per capsule; Take 1 capsule by mouth once daily.  Dispense: 90 capsule; Refill: 3  -     X-Ray Cervical Spine Complete 5 view; Future; Expected date: 11/07/2023    Mixed hyperlipidemia  -     Comprehensive Metabolic Panel; Future; Expected date: 11/07/2023  -     Lipid Panel; Future; Expected date: 11/07/2023  On atorvastatin 10 mg daily need to check his lipid panel  Stenosis of right vertebral artery  Follow-up with Dr. Frank Rey for further studies ultrasounds are pending.  Left carotid stenosis    Smoker    Right-sided extracranial carotid artery stenosis      Follow up in about 6 months (around 5/7/2024), or Hypertension, hyperlipidemia, carotid stenosis.        11/7/2023 Neri Blue

## 2023-11-08 NOTE — PROGRESS NOTES
Call patient.  Has significant degenerative disc that C3-4 C4-5 and C5-6.  Does have some bone spurs at C3-4 also.  This is the cause of his neck pain.  Try the medications we prescribed.  If no improvement, we can offer further testing.

## 2023-11-10 LAB
ALBUMIN SERPL-MCNC: 4.1 G/DL (ref 3.6–5.1)
ALBUMIN/GLOB SERPL: 1.6 (CALC) (ref 1–2.5)
ALP SERPL-CCNC: 53 U/L (ref 35–144)
ALT SERPL-CCNC: 11 U/L (ref 9–46)
AST SERPL-CCNC: 19 U/L (ref 10–35)
BILIRUB SERPL-MCNC: 0.3 MG/DL (ref 0.2–1.2)
BUN SERPL-MCNC: 34 MG/DL (ref 7–25)
BUN/CREAT SERPL: 17 (CALC) (ref 6–22)
CALCIUM SERPL-MCNC: 9.8 MG/DL (ref 8.6–10.3)
CHLORIDE SERPL-SCNC: 108 MMOL/L (ref 98–110)
CHOLEST SERPL-MCNC: 224 MG/DL
CHOLEST/HDLC SERPL: 2.2 (CALC)
CO2 SERPL-SCNC: 23 MMOL/L (ref 20–32)
CREAT SERPL-MCNC: 1.95 MG/DL (ref 0.7–1.28)
EGFR: 36 ML/MIN/1.73M2
GLOBULIN SER CALC-MCNC: 2.6 G/DL (CALC) (ref 1.9–3.7)
GLUCOSE SERPL-MCNC: 119 MG/DL (ref 65–99)
HDLC SERPL-MCNC: 103 MG/DL
LDLC SERPL CALC-MCNC: 108 MG/DL (CALC)
NONHDLC SERPL-MCNC: 121 MG/DL (CALC)
POTASSIUM SERPL-SCNC: 5.3 MMOL/L (ref 3.5–5.3)
PROT SERPL-MCNC: 6.7 G/DL (ref 6.1–8.1)
SODIUM SERPL-SCNC: 139 MMOL/L (ref 135–146)
TRIGL SERPL-MCNC: 52 MG/DL

## 2023-11-12 NOTE — PROGRESS NOTES
Call patient.  Sugar and liver look good.  Kidney function has declined some.  Need to stay off of Advil and Aleve.  Okay to take Tylenol for neck pain.  Cholesterol elevated 224 cut back on fried food and fast food.  need to repeat BMP in 2 months to recheck kidney function

## 2023-11-13 ENCOUNTER — TELEPHONE (OUTPATIENT)
Dept: FAMILY MEDICINE | Facility: CLINIC | Age: 68
End: 2023-11-13

## 2023-11-13 NOTE — TELEPHONE ENCOUNTER
----- Message from Neri Blue MD sent at 11/11/2023  8:43 PM CST -----  Call patient.  Sugar and liver look good.  Kidney function has declined some.  Need to stay off of Advil and Aleve.  Okay to take Tylenol for neck pain.  Cholesterol elevated 224 cut back on fried food and fast food.  need to repeat BMP in 2 months to recheck kidney function

## 2023-11-13 NOTE — TELEPHONE ENCOUNTER
Spoke to patient with results verbatim per Dr Blue. Verbalized understanding on all, including dietary restrictions for cholesterol, take tylenol for neck pain, not advil or aleve, and that I will call in 2 months when repeat lab is due. Remind me created.

## 2023-12-19 ENCOUNTER — HOSPITAL ENCOUNTER (OUTPATIENT)
Dept: RADIOLOGY | Facility: HOSPITAL | Age: 68
Discharge: HOME | End: 2023-12-19
Attending: SURGERY
Payer: MEDICARE

## 2023-12-19 DIAGNOSIS — I65.23 BILATERAL CAROTID ARTERY STENOSIS: ICD-10-CM

## 2023-12-19 PROCEDURE — 93880 EXTRACRANIAL BILAT STUDY: CPT | Mod: TC,PO

## 2024-01-09 ENCOUNTER — TELEPHONE (OUTPATIENT)
Dept: FAMILY MEDICINE | Facility: CLINIC | Age: 69
End: 2024-01-09
Payer: MEDICARE

## 2024-01-09 DIAGNOSIS — I10 PRIMARY HYPERTENSION: ICD-10-CM

## 2024-01-09 DIAGNOSIS — Z79.899 ENCOUNTER FOR LONG-TERM (CURRENT) USE OF OTHER MEDICATIONS: Primary | ICD-10-CM

## 2024-01-09 NOTE — TELEPHONE ENCOUNTER
----- [facility] at 11/13/2023  3:52 PM CST -----  Regarding: lab due  ----- Message from Neri Blue MD sent at 11/11/2023  8:43 PM CST -----  Call patient.  Sugar and liver look good.  Kidney function has declined some.  Need to stay off of Advil and Aleve.  Okay to take Tylenol for neck pain.  Cholesterol elevated 224 cut back on fried food and fast food.  need to repeat BMP in 2 months to recheck kidney function

## 2024-01-09 NOTE — TELEPHONE ENCOUNTER
Spoke to patient that fasting lab is due to recheck kidney function, order at [facility], gave fasting instructions. Updated remind me. Order pended.

## 2024-01-18 LAB
BUN SERPL-MCNC: 21 MG/DL (ref 7–25)
BUN/CREAT SERPL: 14 (CALC) (ref 6–22)
CALCIUM SERPL-MCNC: 9.8 MG/DL (ref 8.6–10.3)
CHLORIDE SERPL-SCNC: 109 MMOL/L (ref 98–110)
CO2 SERPL-SCNC: 23 MMOL/L (ref 20–32)
CREAT SERPL-MCNC: 1.45 MG/DL (ref 0.7–1.28)
EGFR: 52 ML/MIN/1.73M2
GLUCOSE SERPL-MCNC: 86 MG/DL (ref 65–99)
POTASSIUM SERPL-SCNC: 4.6 MMOL/L (ref 3.5–5.3)
SODIUM SERPL-SCNC: 140 MMOL/L (ref 135–146)

## 2024-03-25 ENCOUNTER — OFFICE VISIT (OUTPATIENT)
Dept: URGENT CARE | Facility: CLINIC | Age: 69
End: 2024-03-25
Payer: MEDICARE

## 2024-03-25 ENCOUNTER — TELEPHONE (OUTPATIENT)
Dept: FAMILY MEDICINE | Facility: CLINIC | Age: 69
End: 2024-03-25
Payer: MEDICARE

## 2024-03-25 VITALS
OXYGEN SATURATION: 99 % | WEIGHT: 152 LBS | DIASTOLIC BLOOD PRESSURE: 79 MMHG | RESPIRATION RATE: 16 BRPM | TEMPERATURE: 98 F | HEART RATE: 75 BPM | SYSTOLIC BLOOD PRESSURE: 133 MMHG | BODY MASS INDEX: 23.04 KG/M2 | HEIGHT: 68 IN

## 2024-03-25 DIAGNOSIS — S46.811A TRAPEZIUS MUSCLE STRAIN, RIGHT, INITIAL ENCOUNTER: Primary | ICD-10-CM

## 2024-03-25 DIAGNOSIS — M54.2 NECK PAIN ON RIGHT SIDE: ICD-10-CM

## 2024-03-25 PROCEDURE — 96372 THER/PROPH/DIAG INJ SC/IM: CPT | Mod: S$GLB,,, | Performed by: EMERGENCY MEDICINE

## 2024-03-25 PROCEDURE — 99214 OFFICE O/P EST MOD 30 MIN: CPT | Mod: 25,S$GLB,,

## 2024-03-25 RX ORDER — DEXAMETHASONE SODIUM PHOSPHATE 4 MG/ML
4 INJECTION, SOLUTION INTRA-ARTICULAR; INTRALESIONAL; INTRAMUSCULAR; INTRAVENOUS; SOFT TISSUE
Status: COMPLETED | OUTPATIENT
Start: 2024-03-25 | End: 2024-03-25

## 2024-03-25 RX ORDER — METHOCARBAMOL 500 MG/1
500 TABLET, FILM COATED ORAL 4 TIMES DAILY
Qty: 28 TABLET | Refills: 0 | Status: SHIPPED | OUTPATIENT
Start: 2024-03-25 | End: 2024-04-01

## 2024-03-25 RX ADMIN — DEXAMETHASONE SODIUM PHOSPHATE 4 MG: 4 INJECTION, SOLUTION INTRA-ARTICULAR; INTRALESIONAL; INTRAMUSCULAR; INTRAVENOUS; SOFT TISSUE at 01:03

## 2024-03-25 NOTE — PATIENT INSTRUCTIONS
Follow up with PCP    When taking muscle relaxer, please do not drive or operate heavy machinery until you know how they affect you. These medications have a tendency to make you drowsy.

## 2024-03-25 NOTE — PROGRESS NOTES
"Subjective:      Patient ID: Koffi Merritt is a 69 y.o. male.    Vitals:  height is 5' 8" (1.727 m) and weight is 68.9 kg (152 lb). His oral temperature is 98.4 °F (36.9 °C). His blood pressure is 133/79 and his pulse is 75. His respiration is 16 and oxygen saturation is 99%.     Chief Complaint: Neck Pain    Right sided neck pain/stiff neck X 2 days.  Patient has tried OTC pain meds, with no relief.     Neck Pain   This is a new problem. The current episode started in the past 7 days. The pain is present in the right side. The pain is at a severity of 10/10. Exacerbated by: movement of neck. Pertinent negatives include no fever or numbness. He has tried NSAIDs and acetaminophen for the symptoms.       Constitution: Negative for chills, fatigue and fever.   HENT:  Negative for ear pain and facial swelling.    Neck: Right side  Positive for neck pain and neck stiffness. Negative for painful lymph nodes and neck swelling.   Cardiovascular: Negative.    Respiratory: Negative.     Gastrointestinal: Negative.    Endocrine: negative.   Musculoskeletal:  Positive for muscle ache.   Skin: Negative.    Neurological: Negative.  Negative for numbness and tingling.   Hematologic/Lymphatic: Negative for swollen lymph nodes.   Psychiatric/Behavioral: Negative.        Objective:     Physical Exam   Constitutional: He is oriented to person, place, and time. No distress. normal  HENT:   Head: Normocephalic and atraumatic.   Ears:   Right Ear: External ear normal.   Left Ear: External ear normal.   Nose: Nose normal.   Mouth/Throat: Mucous membranes are moist. Oropharynx is clear.   Eyes: Conjunctivae are normal.   Neck: There are no signs of injury.          Comments: Neck pain occurs with even the slightest movements. No known injury. neck rigidity present. decreased range of motion present. pain with movement present.   Cardiovascular: Normal rate.   Pulmonary/Chest: Effort normal. No respiratory distress.   Abdominal: Normal " appearance.   Musculoskeletal:         General: Tenderness present. No swelling, deformity or signs of injury.      Cervical back: He exhibits tenderness.   Neurological: He is alert and oriented to person, place, and time. He displays no weakness.   Skin: Skin is warm and dry. Capillary refill takes less than 2 seconds.   Psychiatric: His behavior is normal. Mood, judgment and thought content normal.   Nursing note and vitals reviewed.      Assessment:     1. Trapezius muscle strain, right, initial encounter    2. Neck pain on right side        Plan:       Trapezius muscle strain, right, initial encounter  -     dexAMETHasone injection 4 mg  -     methocarbamoL (ROBAXIN) 500 MG Tab; Take 1 tablet (500 mg total) by mouth 4 (four) times daily. for 7 days  Dispense: 28 tablet; Refill: 0    Neck pain on right side      Discussed medication with patient who acknowledges understanding and is agreeable to POC. Follow up with primary care. Increase fluid intake. Red flags for ER discussed.

## 2024-03-25 NOTE — TELEPHONE ENCOUNTER
Spoke with patient and the right sided neck pain started on Saturday this past weekend. Denies injury and states this is a consent aching pain that he rates at a 10 out of 10 on pain scale. Has tired Tylenol Extra Strength, and Advil, helping some. Has tried heating pad with no relief.  Reports when laying down it is very difficult to get up and has been difficult to walk. Is currently experiencing dizziness. Recommend with reported symptoms and pain for patient to seek further evaluation at an Urgent Care Setting. Patient verbalized understanding and does have someone to drive to Urgent Care today.

## 2024-03-25 NOTE — TELEPHONE ENCOUNTER
----- Message from Whit Linton sent at 3/25/2024 10:55 AM CDT -----  Pt needs to be seen asap. Pain on left side of neck. Pt #[phone]

## 2024-07-08 DIAGNOSIS — E78.2 MIXED HYPERLIPIDEMIA: ICD-10-CM

## 2024-07-08 RX ORDER — ATORVASTATIN CALCIUM 10 MG/1
10 TABLET, FILM COATED ORAL DAILY
Qty: 30 TABLET | Refills: 0 | Status: SHIPPED | OUTPATIENT
Start: 2024-07-08

## 2024-07-08 NOTE — TELEPHONE ENCOUNTER
----- Message from Brea Hope sent at 7/8/2024 11:20 AM CDT -----  Pt needs a refill on atorvastatin   [facility] [place]   [phone]

## 2024-08-06 ENCOUNTER — TELEPHONE (OUTPATIENT)
Dept: FAMILY MEDICINE | Facility: CLINIC | Age: 69
End: 2024-08-06
Payer: MEDICARE

## 2024-08-13 ENCOUNTER — OFFICE VISIT (OUTPATIENT)
Dept: FAMILY MEDICINE | Facility: CLINIC | Age: 69
End: 2024-08-13
Payer: MEDICARE

## 2024-08-13 VITALS
DIASTOLIC BLOOD PRESSURE: 64 MMHG | OXYGEN SATURATION: 97 % | WEIGHT: 145.19 LBS | BODY MASS INDEX: 21.51 KG/M2 | HEART RATE: 94 BPM | SYSTOLIC BLOOD PRESSURE: 118 MMHG | HEIGHT: 69 IN

## 2024-08-13 DIAGNOSIS — N40.1 BENIGN PROSTATIC HYPERPLASIA WITH LOWER URINARY TRACT SYMPTOMS, SYMPTOM DETAILS UNSPECIFIED: ICD-10-CM

## 2024-08-13 DIAGNOSIS — G56.22 CUBITAL TUNNEL SYNDROME ON LEFT: ICD-10-CM

## 2024-08-13 DIAGNOSIS — I73.9 CLAUDICATION: ICD-10-CM

## 2024-08-13 DIAGNOSIS — E78.2 MIXED HYPERLIPIDEMIA: ICD-10-CM

## 2024-08-13 DIAGNOSIS — I65.22 LEFT CAROTID STENOSIS: ICD-10-CM

## 2024-08-13 DIAGNOSIS — F17.200 SMOKER: ICD-10-CM

## 2024-08-13 DIAGNOSIS — R79.9 ABNORMAL FINDING OF BLOOD CHEMISTRY, UNSPECIFIED: ICD-10-CM

## 2024-08-13 DIAGNOSIS — I10 PRIMARY HYPERTENSION: Primary | ICD-10-CM

## 2024-08-13 DIAGNOSIS — D64.9 ANEMIA, UNSPECIFIED TYPE: ICD-10-CM

## 2024-08-13 DIAGNOSIS — Z12.5 ENCOUNTER FOR SCREENING FOR MALIGNANT NEOPLASM OF PROSTATE: ICD-10-CM

## 2024-08-13 DIAGNOSIS — I65.21 RIGHT-SIDED EXTRACRANIAL CAROTID ARTERY STENOSIS: ICD-10-CM

## 2024-08-13 DIAGNOSIS — I65.01 STENOSIS OF RIGHT VERTEBRAL ARTERY: ICD-10-CM

## 2024-08-13 DIAGNOSIS — R80.9 PROTEINURIA, UNSPECIFIED TYPE: ICD-10-CM

## 2024-08-13 PROCEDURE — 3288F FALL RISK ASSESSMENT DOCD: CPT | Mod: CPTII,S$GLB,,

## 2024-08-13 PROCEDURE — 1101F PT FALLS ASSESS-DOCD LE1/YR: CPT | Mod: CPTII,S$GLB,,

## 2024-08-13 PROCEDURE — 4010F ACE/ARB THERAPY RXD/TAKEN: CPT | Mod: CPTII,S$GLB,,

## 2024-08-13 PROCEDURE — 99214 OFFICE O/P EST MOD 30 MIN: CPT | Mod: S$GLB,,,

## 2024-08-13 PROCEDURE — 1159F MED LIST DOCD IN RCRD: CPT | Mod: CPTII,S$GLB,,

## 2024-08-13 PROCEDURE — 3074F SYST BP LT 130 MM HG: CPT | Mod: CPTII,S$GLB,,

## 2024-08-13 PROCEDURE — 3008F BODY MASS INDEX DOCD: CPT | Mod: CPTII,S$GLB,,

## 2024-08-13 PROCEDURE — 3078F DIAST BP <80 MM HG: CPT | Mod: CPTII,S$GLB,,

## 2024-08-13 PROCEDURE — 1126F AMNT PAIN NOTED NONE PRSNT: CPT | Mod: CPTII,S$GLB,,

## 2024-08-13 RX ORDER — METHYLPREDNISOLONE 4 MG/1
TABLET ORAL
Qty: 21 EACH | Refills: 0 | Status: SHIPPED | OUTPATIENT
Start: 2024-08-13 | End: 2024-09-03

## 2024-08-13 RX ORDER — ATORVASTATIN CALCIUM 10 MG/1
10 TABLET, FILM COATED ORAL DAILY
Qty: 90 TABLET | Refills: 3 | Status: SHIPPED | OUTPATIENT
Start: 2024-08-13 | End: 2025-08-13

## 2024-08-13 RX ORDER — CILOSTAZOL 100 MG/1
100 TABLET ORAL 2 TIMES DAILY
COMMUNITY

## 2024-08-13 RX ORDER — AMLODIPINE AND BENAZEPRIL HYDROCHLORIDE 10; 40 MG/1; MG/1
1 CAPSULE ORAL DAILY
Qty: 90 CAPSULE | Refills: 3 | Status: SHIPPED | OUTPATIENT
Start: 2024-08-13

## 2024-08-13 NOTE — PATIENT INSTRUCTIONS
Lacho Rain,     If you are due for any health screening(s) below please notify me so we can arrange them to be ordered and scheduled. Most healthy patients at your age complete them, but you are free to accept or refuse.     If you can't do it, I'll definitely understand. If you can, I'd certainly appreciate it!    All of your core healthy metrics are met.      Were here to help you quit smoking     Our records indicated that you are still smoking. One of the best things you can do for your health is to stop smoking and we are here to help.     Talk with your provider about our Smoking Cessation Program and how we can support you on your journey.     3-10 MINUTES OF COUNSELING GIVEN:    Smoking is one of the hardest habits to break. About half of all those who have ever smoked have been able to quit, and most of those (about 70%) who still smoke want to quit. Here are some of the best ways to stop smoking.    KEEP TRYING:  It takes most smokers about 8 tries before they are finally able to fully quit. So, the more often you try and fail, the better your chance of quitting the next time! So, don't give up!  GO COLD TURKEY:  Most ex-smokers quit cold turkey. Trying to cut back gradually doesn't seem to work as well, perhaps because it continues the smoking habit. Also, it is possible to fool yourself by inhaling more while smoking fewer cigarettes. This results in the same amount of nicotine in your body!  GET SUPPORT:  Support programs can make an important difference, especially for the heavy smoker. These groups offer lectures, methods to change your behavior and peer support. Call the free national Quitline for more information. 800-QUIT-NOW (766-473-9474). Low-cost or free programs are offered by many hospitals, local chapters of the American Lung Association (200-642-2386) and the American Cancer Society (585-271-2901). Support at home is important too. Non-smokers can help by offering praise and encouragement.  If the smoker fails to quit, encourage them to try again!  OVER-THE-COUNTER MEDICINES:  For those who can't quit on their own, Nicotine Replacement Therapy (NRT) may make quitting much easier. Certain aids such as the nicotine patch, gum and lozenge are available without a prescription. However, it is best to use these under the guidance of your doctor. The skin patch provides a steady supply of nicotine to the body. Nicotine gum and lozenge gives temporary bursts of low levels of nicotine. Both methods take the edge off the craving for cigarettes. WARNING: If you feel symptoms of nicotine overdose, such as nausea, vomiting, dizziness, weakness, or fast heartbeat, stop using these and see your doctor.  PRESCRIPTION MEDICINES:  After evaluating your smoking patterns and prior attempts at quitting, your doctor may offer a prescription medicine such as bupropion (Zyban, Wellbutrin), varenicline (Chantix, Champix), a niocotine inhaler or nasal spray. Each has its unique advantage and side effects which your doctor can review with you.  HEALTH BENEFITS OF QUITTING:  The benefits of quitting start right away and keep improving the longer you go without smokin minutes: blood pressure and pulse return to normal  8 hours: oxygen levels return to normal  2 days: ability to smell and taste begins to improve as damaged nerves start to regrow  2-3 weeks: circulation and lung function improves  1-9 months: decreased cough, congestion and shortness of breath; less tired  1 year: risk of heart attack decreases by half  5 years: risk of lung cancer decreases by half; risk of stroke becomes the same as a non-smoker

## 2024-09-01 PROBLEM — I73.9 CLAUDICATION: Status: ACTIVE | Noted: 2024-09-01

## 2024-09-01 PROBLEM — N40.1 BENIGN PROSTATIC HYPERPLASIA WITH LOWER URINARY TRACT SYMPTOMS: Status: ACTIVE | Noted: 2024-09-01

## 2024-09-01 PROBLEM — R80.9 PROTEINURIA: Status: ACTIVE | Noted: 2024-09-01

## 2024-09-01 PROBLEM — R79.9 ABNORMAL FINDING OF BLOOD CHEMISTRY, UNSPECIFIED: Status: ACTIVE | Noted: 2024-09-01

## 2024-09-01 PROBLEM — G56.22 CUBITAL TUNNEL SYNDROME ON LEFT: Status: ACTIVE | Noted: 2024-09-01

## 2024-09-01 PROBLEM — D64.9 ANEMIA: Status: ACTIVE | Noted: 2024-09-01

## 2024-09-02 NOTE — PROGRESS NOTES
SUBJECTIVE:    Patient ID: Koffi Merritt is a 69 y.o. male.    Chief Complaint: Follow-up (Bottles brought//Pt is here for a 6 month follow up and med refill//pt states for about 2 weeks under his skin of his left forearm has been bothering him like an itch or scratch not really painful//KE)    69 year old established male here for follow up.  He does need some refills today.  He has been having some tingling the his left forearm for around two weeks now. States it feels numb and sometimes at night it wakes him with an aching feeling. Makes his fingers feel numb.      Due for labs.  Hepatitis C Screening Never done  TETANUS VACCINE Never done  LDCT Lung Screen Never done  Shingles Vaccine(1 of 2) Never done  Aspirin/Antiplatelet Therapy due on 11/07/2024  High Dose Statin due on 08/13/2025  Lipid Panel due on 11/09/2028  Colorectal Cancer Screening due on 01/11/2032  Abdominal Aortic Aneurysm Screening Completed      Follow-up  Associated symptoms include numbness. Pertinent negatives include no arthralgias, chest pain, headaches, joint swelling, neck pain, vomiting or weakness.       No visits with results within 6 Month(s) from this visit.   Latest known visit with results is:   Telephone on 01/09/2024   Component Date Value Ref Range Status    Glucose 01/17/2024 86  65 - 99 mg/dL Final    BUN 01/17/2024 21  7 - 25 mg/dL Final    Creatinine 01/17/2024 1.45 (H)  0.70 - 1.28 mg/dL Final    eGFR 01/17/2024 52 (L)  > OR = 60 mL/min/1.73m2 Final    BUN/Creatinine Ratio 01/17/2024 14  6 - 22 (calc) Final    Sodium 01/17/2024 140  135 - 146 mmol/L Final    Potassium 01/17/2024 4.6  3.5 - 5.3 mmol/L Final    Chloride 01/17/2024 109  98 - 110 mmol/L Final    CO2 01/17/2024 23  20 - 32 mmol/L Final    Calcium 01/17/2024 9.8  8.6 - 10.3 mg/dL Final       Past Medical History:   Diagnosis Date    Carotid stenosis, left     Hypertension      Social History     Socioeconomic History    Marital status:     Number of  children: 5   Occupational History    Occupation: Retired [place], worked at [employer] in [employer]ing    Tobacco Use    Smoking status: Every Day     Current packs/day: 0.50     Average packs/day: 0.5 packs/day for 40.0 years (20.0 ttl pk-yrs)     Types: Cigarettes    Smokeless tobacco: Never    Tobacco comments:     Advised not to smoke DOS   Substance and Sexual Activity    Alcohol use: Yes     Alcohol/week: 3.0 standard drinks of alcohol     Types: 3 Cans of beer per week     Comment: nightly      Social Determinants of Health     Financial Resource Strain: Low Risk  (2024)    Overall Financial Resource Strain (CARDIA)     Difficulty of Paying Living Expenses: Not hard at all   Food Insecurity: No Food Insecurity (2024)    Hunger Vital Sign     Worried About Running Out of Food in the Last Year: Never true     Ran Out of Food in the Last Year: Never true   Physical Activity: Insufficiently Active (2024)    Exercise Vital Sign     Days of Exercise per Week: 3 days     Minutes of Exercise per Session: 40 min   Stress: No Stress Concern Present (2024)    [employer] - Occupational Stress Questionnaire     Feeling of Stress : Not at all   Housing Stability: Unknown (2024)    Housing Stability Vital Sign     Unable to Pay for Housing in the Last Year: No     Past Surgical History:   Procedure Laterality Date    CAROTID ENDARTERECTOMY Left 2023    Procedure: ENDARTERECTOMY, CAROTID;  Surgeon: Frank Rey MD;  Location: [facility];  Service: General;  Laterality: Left;  EEG!     Family History   Problem Relation Name Age of Onset    Diabetes Mother      Kidney failure Mother           at 82    Heart failure Father           in the late 50's.     Coronary artery disease Brother          stent placed, DM II, alive at 70       The 10-year CVD risk score (UTE'Agoino, et al., 2008) is: 27.7%    Values used to calculate the score:      Age: 69 years      Sex: Male       Diabetic: No      Tobacco smoker: Yes      Systolic Blood Pressure: 118 mmHg      Is BP treated: Yes      HDL Cholesterol: 103 mg/dL      Total Cholesterol: 224 mg/dL    Tests to Keep You Healthy    Colon Cancer Screening: Met on 1/11/2022  Last Blood Pressure <= 139/89 (8/13/2024): Yes  Tobacco Cessation: NO      Review of patient's allergies indicates:  No Known Allergies    Current Outpatient Medications:     ascorbic acid, vitamin C, (VITAMIN C) 1000 MG tablet, Take 1,000 mg by mouth once daily., Disp: , Rfl:     cilostazoL (PLETAL) 100 MG Tab, Take 100 mg by mouth 2 (two) times daily., Disp: , Rfl:     multivitamin capsule, Take 1 capsule by mouth once daily., Disp: , Rfl:     tamsulosin (FLOMAX) 0.4 mg Cap, Take 0.4 mg by mouth 2 (two) times a day., Disp: , Rfl:     amLODIPine-benazepriL (LOTREL) 10-40 mg per capsule, Take 1 capsule by mouth once daily., Disp: 90 capsule, Rfl: 3    aspirin (ECOTRIN) 81 MG EC tablet, Take 81 mg by mouth once daily. (Patient not taking: Reported on 8/13/2024), Disp: , Rfl:     atorvastatin (LIPITOR) 10 MG tablet, Take 1 tablet (10 mg total) by mouth once daily., Disp: 90 tablet, Rfl: 3    methylPREDNISolone (MEDROL DOSEPACK) 4 mg tablet, use as directed, Disp: 21 each, Rfl: 0    Review of Systems   Constitutional:  Negative for activity change and unexpected weight change.   HENT:  Negative for hearing loss, rhinorrhea and trouble swallowing.    Eyes:  Negative for discharge and visual disturbance.   Respiratory:  Negative for chest tightness and wheezing.    Cardiovascular:  Negative for chest pain and palpitations.   Gastrointestinal:  Negative for blood in stool, constipation, diarrhea and vomiting.   Endocrine: Negative for polydipsia and polyuria.   Genitourinary:  Negative for difficulty urinating, hematuria and urgency.   Musculoskeletal:  Negative for arthralgias, joint swelling and neck pain.   Neurological:  Positive for numbness. Negative for weakness and headaches.  "  Psychiatric/Behavioral:  Negative for confusion and dysphoric mood.            Objective:      Vitals:    08/13/24 1340   BP: 118/64   Pulse: 94   SpO2: 97%   Weight: 65.9 kg (145 lb 3.2 oz)   Height: 5' 8.5" (1.74 m)     Physical Exam  Vitals and nursing note reviewed.   Constitutional:       General: He is not in acute distress.     Appearance: Normal appearance. He is well-developed. He is not toxic-appearing.   HENT:      Head: Normocephalic and atraumatic.      Right Ear: Tympanic membrane and external ear normal.      Left Ear: Tympanic membrane and external ear normal.      Nose: Nose normal.      Mouth/Throat:      Pharynx: Oropharynx is clear.   Eyes:      Pupils: Pupils are equal, round, and reactive to light.   Neck:      Thyroid: No thyromegaly.      Vascular: Carotid bruit (Left carotid bruit is mild) present.      Comments: Tender to palpation the left occipital cervical junction.  Neck has 60 degree rotation right and left.  Flexion extension is normal.  Cardiovascular:      Rate and Rhythm: Normal rate and regular rhythm.      Heart sounds: Normal heart sounds. No murmur heard.  Pulmonary:      Effort: Pulmonary effort is normal.      Breath sounds: Normal breath sounds. No wheezing or rales.   Abdominal:      General: Bowel sounds are normal. There is no distension.      Palpations: Abdomen is soft.      Tenderness: There is no abdominal tenderness.   Musculoskeletal:         General: No tenderness or deformity. Normal range of motion.      Cervical back: Normal range of motion and neck supple.      Lumbar back: Normal. No spasms.      Comments: Bends 90 degrees at  waist   Lymphadenopathy:      Cervical: No cervical adenopathy.   Skin:     General: Skin is warm and dry.      Findings: No rash.   Neurological:      Mental Status: He is alert and oriented to person, place, and time. Mental status is at baseline.      Cranial Nerves: No cranial nerve deficit.      Coordination: Coordination normal. "      Gait: Gait normal.   Psychiatric:         Mood and Affect: Mood normal.         Behavior: Behavior normal.         Thought Content: Thought content normal.         Judgment: Judgment normal.           Assessment:       1. Primary hypertension    2. Mixed hyperlipidemia    3. Right-sided extracranial carotid artery stenosis    4. Left carotid stenosis    5. Stenosis of right vertebral artery    6. Claudication    7. Smoker    8. Cubital tunnel syndrome on left    9. Anemia, unspecified type    10. Proteinuria, unspecified type    11. Benign prostatic hyperplasia with lower urinary tract symptoms, symptom details unspecified    12. Encounter for screening for malignant neoplasm of prostate    13. Abnormal finding of blood chemistry, unspecified         Plan:       Primary hypertension  Blood pressure goals discussed with patient.  Tolerating current medications.  BP controlled today.  Continue current management.  Labs for evaluation of health/monitoring of condition.   -     amLODIPine-benazepriL (LOTREL) 10-40 mg per capsule; Take 1 capsule by mouth once daily.  Dispense: 90 capsule; Refill: 3  -     CBC Auto Differential; Future; Expected date: 08/13/2024  -     Comprehensive Metabolic Panel; Future; Expected date: 08/13/2024  -     Microalbumin/Creatinine Ratio, Urine; Future; Expected date: 08/13/2024  -     TSH w/reflex to FT4; Future; Expected date: 08/13/2024  -     Urinalysis, Reflex to Urine Culture Urine, Clean Catch; Future; Expected date: 08/13/2024    Mixed hyperlipidemia  Dyslipidemia:   - Controlled    - On statin per ACC recommendations, will continue. No untoward side effects reported.   - Monitor LDL yearly at minimum  Labs for evaluation of health/monitoring of condition.   -     atorvastatin (LIPITOR) 10 MG tablet; Take 1 tablet (10 mg total) by mouth once daily.  Dispense: 90 tablet; Refill: 3  -     CBC Auto Differential; Future; Expected date: 08/13/2024  -     Comprehensive Metabolic  Panel; Future; Expected date: 08/13/2024  -     Lipid Panel; Future; Expected date: 08/13/2024    Right-sided extracranial carotid artery stenosis  Labs for evaluation of health/monitoring of condition.   -     Lipid Panel; Future; Expected date: 08/13/2024  -     Magnesium; Future; Expected date: 08/13/2024    Left carotid stenosis  Labs for evaluation of health/monitoring of condition.   -     Lipid Panel; Future; Expected date: 08/13/2024  -     Magnesium; Future; Expected date: 08/13/2024    Stenosis of right vertebral artery  Labs for evaluation of health/monitoring of condition.   -     Lipid Panel; Future; Expected date: 08/13/2024  -     Magnesium; Future; Expected date: 08/13/2024    Claudication  Continue Pletal    Smoker  Patient counseled on smoking cessation. I discussed options such as nicotine replacement products, wellbutrin, and chantix.  Side effects, benefits and risks were discussed regarding each.  Printed materials were given.  I offered a referral to [facility] smoking cessation program.  All questions were answered.    Cubital tunnel syndrome on left  -     Ambulatory referral/consult to Orthopedics; Future; Expected date: 08/20/2024  -     methylPREDNISolone (MEDROL DOSEPACK) 4 mg tablet; use as directed  Dispense: 21 each; Refill: 0    Anemia, unspecified type  Labs for evaluation of health/monitoring of condition.   -     CBC Auto Differential; Future; Expected date: 08/13/2024  -     Iron and TIBC; Future; Expected date: 08/13/2024  -     Ferritin; Future; Expected date: 08/13/2024    Proteinuria, unspecified type  Labs for evaluation of health/monitoring of condition.   -     CBC Auto Differential; Future; Expected date: 08/13/2024  -     Comprehensive Metabolic Panel; Future; Expected date: 08/13/2024    Benign prostatic hyperplasia with lower urinary tract symptoms, symptom details unspecified  Symptoms controlled. Continue as is.    Encounter for screening for malignant neoplasm of  prostate  Patient of appropriate population group due for screening test.   -     PSA, Screening; Future; Expected date: 08/13/2024    Abnormal finding of blood chemistry, unspecified  Labs for evaluation of health/monitoring of condition.   -     CBC Auto Differential; Future; Expected date: 08/13/2024  -     Comprehensive Metabolic Panel; Future; Expected date: 08/13/2024  -     Hemoglobin A1C; Future; Expected date: 08/13/2024      Follow up in about 3 months (around 11/13/2024).        9/1/2024 Tanya Sheehan

## 2024-10-31 ENCOUNTER — TELEPHONE (OUTPATIENT)
Dept: FAMILY MEDICINE | Facility: CLINIC | Age: 69
End: 2024-10-31
Payer: MEDICARE

## 2024-11-14 ENCOUNTER — OFFICE VISIT (OUTPATIENT)
Dept: FAMILY MEDICINE | Facility: CLINIC | Age: 69
End: 2024-11-14
Payer: MEDICARE

## 2024-11-14 VITALS
HEART RATE: 84 BPM | SYSTOLIC BLOOD PRESSURE: 138 MMHG | HEIGHT: 69 IN | BODY MASS INDEX: 21.77 KG/M2 | OXYGEN SATURATION: 98 % | WEIGHT: 147 LBS | DIASTOLIC BLOOD PRESSURE: 80 MMHG

## 2024-11-14 DIAGNOSIS — I73.9 CLAUDICATION: ICD-10-CM

## 2024-11-14 DIAGNOSIS — F17.200 SMOKER: ICD-10-CM

## 2024-11-14 DIAGNOSIS — I65.21 RIGHT-SIDED EXTRACRANIAL CAROTID ARTERY STENOSIS: Primary | ICD-10-CM

## 2024-11-14 DIAGNOSIS — I65.01 STENOSIS OF RIGHT VERTEBRAL ARTERY: ICD-10-CM

## 2024-11-14 DIAGNOSIS — I10 PRIMARY HYPERTENSION: ICD-10-CM

## 2024-11-14 DIAGNOSIS — I65.22 LEFT CAROTID STENOSIS: ICD-10-CM

## 2024-11-14 DIAGNOSIS — D64.9 ANEMIA, UNSPECIFIED TYPE: ICD-10-CM

## 2024-11-14 DIAGNOSIS — E78.2 MIXED HYPERLIPIDEMIA: ICD-10-CM

## 2024-11-14 DIAGNOSIS — N40.1 BENIGN PROSTATIC HYPERPLASIA WITH LOWER URINARY TRACT SYMPTOMS, SYMPTOM DETAILS UNSPECIFIED: ICD-10-CM

## 2024-11-14 PROCEDURE — 1159F MED LIST DOCD IN RCRD: CPT | Mod: CPTII,S$GLB,, | Performed by: FAMILY MEDICINE

## 2024-11-14 PROCEDURE — 3044F HG A1C LEVEL LT 7.0%: CPT | Mod: CPTII,S$GLB,, | Performed by: FAMILY MEDICINE

## 2024-11-14 PROCEDURE — 3066F NEPHROPATHY DOC TX: CPT | Mod: CPTII,S$GLB,, | Performed by: FAMILY MEDICINE

## 2024-11-14 PROCEDURE — 4010F ACE/ARB THERAPY RXD/TAKEN: CPT | Mod: CPTII,S$GLB,, | Performed by: FAMILY MEDICINE

## 2024-11-14 PROCEDURE — 3075F SYST BP GE 130 - 139MM HG: CPT | Mod: CPTII,S$GLB,, | Performed by: FAMILY MEDICINE

## 2024-11-14 PROCEDURE — 3062F POS MACROALBUMINURIA REV: CPT | Mod: CPTII,S$GLB,, | Performed by: FAMILY MEDICINE

## 2024-11-14 PROCEDURE — 99214 OFFICE O/P EST MOD 30 MIN: CPT | Mod: S$GLB,,, | Performed by: FAMILY MEDICINE

## 2024-11-14 PROCEDURE — 3079F DIAST BP 80-89 MM HG: CPT | Mod: CPTII,S$GLB,, | Performed by: FAMILY MEDICINE

## 2024-11-14 PROCEDURE — 3288F FALL RISK ASSESSMENT DOCD: CPT | Mod: CPTII,S$GLB,, | Performed by: FAMILY MEDICINE

## 2024-11-14 PROCEDURE — 3008F BODY MASS INDEX DOCD: CPT | Mod: CPTII,S$GLB,, | Performed by: FAMILY MEDICINE

## 2024-11-14 PROCEDURE — 1101F PT FALLS ASSESS-DOCD LE1/YR: CPT | Mod: CPTII,S$GLB,, | Performed by: FAMILY MEDICINE

## 2024-11-14 RX ORDER — TAMSULOSIN HYDROCHLORIDE 0.4 MG/1
0.4 CAPSULE ORAL 2 TIMES DAILY
Qty: 180 CAPSULE | Refills: 3 | Status: SHIPPED | OUTPATIENT
Start: 2024-11-14

## 2024-11-14 RX ORDER — ATORVASTATIN CALCIUM 20 MG/1
20 TABLET, FILM COATED ORAL DAILY
Qty: 90 TABLET | Refills: 3 | Status: SHIPPED | OUTPATIENT
Start: 2024-11-14 | End: 2025-11-14

## 2024-11-16 NOTE — PROGRESS NOTES
SUBJECTIVE:    Patient ID: Koffi Merritt is a 69 y.o. male.    Chief Complaint: Eosinophilia (No bottles, declined flu vaccine, review Lab-results, need refill, legs cramps, discuss about medication, abc )    This 69-year-old male has a history of chronic kidney disease stage 3, right internal artery occlusion or stenosis, history of left CEA.  Sometimes his left arm his tingling and aching., prednisone seem to help this    He is still waking up 3 times per night for nocturia.  Taking Flomax twice a day.    Carotid artery stenosis, has had a left carotid endarterectomy by Dr. Frank Rey.  He gets routine ultrasounds of the neck yearly with Dr. Rey's office.  Was prescribed cilostazol 100 mg b.i.d. he states he can walk 1-2 blocks without claudication or chest pain    Hyperlipidemia-atorvastatin 10 mg daily    2022-colonoscopy with Dr. Rascon[facility] 5 years    PSA 0.98        Office Visit on 08/13/2024   Component Date Value Ref Range Status    WBC 11/07/2024 8.6  3.8 - 10.8 Thousand/uL Final    RBC 11/07/2024 4.24  4.20 - 5.80 Million/uL Final    Hemoglobin 11/07/2024 13.2  13.2 - 17.1 g/dL Final    Hematocrit 11/07/2024 39.9  38.5 - 50.0 % Final    MCV 11/07/2024 94.1  80.0 - 100.0 fL Final    MCH 11/07/2024 31.1  27.0 - 33.0 pg Final    MCHC 11/07/2024 33.1  32.0 - 36.0 g/dL Final    RDW 11/07/2024 14.5  11.0 - 15.0 % Final    Platelets 11/07/2024 288  140 - 400 Thousand/uL Final    MPV 11/07/2024 11.0  7.5 - 12.5 fL Final    Neutrophils, Abs 11/07/2024 4,489  1,500 - 7,800 cells/uL Final    Lymph # 11/07/2024 2,546  850 - 3,900 cells/uL Final    Mono # 11/07/2024 774  200 - 950 cells/uL Final    Eos # 11/07/2024 654 (H)  15 - 500 cells/uL Final    Baso # 11/07/2024 138  0 - 200 cells/uL Final    Neutrophils Relative 11/07/2024 52.2  % Final    Lymph % 11/07/2024 29.6  % Final    Mono % 11/07/2024 9.0  % Final    Eosinophil % 11/07/2024 7.6  % Final    Basophil % 11/07/2024 1.6  % Final    Glucose 11/07/2024 111  (H)  65 - 99 mg/dL Final    BUN 11/07/2024 18  7 - 25 mg/dL Final    Creatinine 11/07/2024 1.77 (H)  0.70 - 1.28 mg/dL Final    eGFR 11/07/2024 41 (L)  > OR = 60 mL/min/1.73m2 Final    BUN/Creatinine Ratio 11/07/2024 10  6 - 22 (calc) Final    Sodium 11/07/2024 142  135 - 146 mmol/L Final    Potassium 11/07/2024 5.1  3.5 - 5.3 mmol/L Final    Chloride 11/07/2024 108  98 - 110 mmol/L Final    CO2 11/07/2024 28  20 - 32 mmol/L Final    Calcium 11/07/2024 9.9  8.6 - 10.3 mg/dL Final    Total Protein 11/07/2024 6.7  6.1 - 8.1 g/dL Final    Albumin 11/07/2024 4.0  3.6 - 5.1 g/dL Final    Globulin, Total 11/07/2024 2.7  1.9 - 3.7 g/dL (calc) Final    Albumin/Globulin Ratio 11/07/2024 1.5  1.0 - 2.5 (calc) Final    Total Bilirubin 11/07/2024 0.3  0.2 - 1.2 mg/dL Final    Alkaline Phosphatase 11/07/2024 54  35 - 144 U/L Final    AST 11/07/2024 17  10 - 35 U/L Final    ALT 11/07/2024 11  9 - 46 U/L Final    Hemoglobin A1C 11/07/2024 5.6  <5.7 % of total Hgb Final    Cholesterol 11/07/2024 212 (H)  <200 mg/dL Final    HDL 11/07/2024 88  > OR = 40 mg/dL Final    Triglycerides 11/07/2024 73  <150 mg/dL Final    LDL Cholesterol 11/07/2024 108 (H)  mg/dL (calc) Final    HDL/Cholesterol Ratio 11/07/2024 2.4  <5.0 (calc) Final    Non HDL Chol. (LDL+VLDL) 11/07/2024 124  <130 mg/dL (calc) Final    Magnesium 11/07/2024 2.2  1.5 - 2.5 mg/dL Final    Creatinine, Urine 11/07/2024 67  20 - 320 mg/dL Final    Microalb, Ur 11/07/2024 27.1  See Note: mg/dL Final    Microalb/Creat Ratio 11/07/2024 404 (H)  <30 mg/g creat Final    TSH w/reflex to FT4 11/07/2024 1.29  0.40 - 4.50 mIU/L Final    Iron 11/07/2024 77  50 - 180 mcg/dL Final    TIBC 11/07/2024 281  250 - 425 mcg/dL (calc) Final    Iron Saturation 11/07/2024 27  20 - 48 % (calc) Final    Ferritin 11/07/2024 74  24 - 380 ng/mL Final    PROSTATE SPECIFIC ANTIGEN, SCR - Q* 11/07/2024 0.98  < OR = 4.00 ng/mL Final       Past Medical History:   Diagnosis Date    Carotid stenosis, left      Hypertension      Social History     Socioeconomic History    Marital status:     Number of children: 5   Occupational History    Occupation: Retired , worked at [employer] in [employer]ing    Tobacco Use    Smoking status: Every Day     Current packs/day: 0.50     Average packs/day: 0.5 packs/day for 40.0 years (20.0 ttl pk-yrs)     Types: Cigarettes    Smokeless tobacco: Never   Substance and Sexual Activity    Alcohol use: Yes     Alcohol/week: 3.0 standard drinks of alcohol     Types: 3 Cans of beer per week     Comment: nightly      Social Drivers of Health     Financial Resource Strain: Low Risk  (2024)    Overall Financial Resource Strain (CARDIA)     Difficulty of Paying Living Expenses: Not hard at all   Food Insecurity: No Food Insecurity (2024)    Hunger Vital Sign     Worried About Running Out of Food in the Last Year: Never true     Ran Out of Food in the Last Year: Never true   Physical Activity: Insufficiently Active (2024)    Exercise Vital Sign     Days of Exercise per Week: 3 days     Minutes of Exercise per Session: 40 min   Stress: No Stress Concern Present (2024)    [employer] [place] [employer] - Occupational Stress Questionnaire     Feeling of Stress : Not at all   Housing Stability: Unknown (2024)    Housing Stability Vital Sign     Unable to Pay for Housing in the Last Year: No     Past Surgical History:   Procedure Laterality Date    CAROTID ENDARTERECTOMY Left 2023    Procedure: ENDARTERECTOMY, CAROTID;  Surgeon: Frank Rey MD;  Location: [facility];  Service: General;  Laterality: Left;  EEG!     Family History   Problem Relation Name Age of Onset    Diabetes Mother      Kidney failure Mother           at 82    Heart failure Father           in the late 50's.     Coronary artery disease Brother          stent placed, DM II, alive at 70       The CVD Risk score (UTE'Agoino, et al., 2008) failed to calculate for the following reasons:    The  patient has a prior MI, stroke, CHF, or peripheral vascular disease diagnosis    Tests to Keep You Healthy    Colon Cancer Screening: Met on 1/11/2022  Last Blood Pressure <= 139/89 (11/14/2024): Yes  Tobacco Cessation: NO      Review of patient's allergies indicates:  No Known Allergies    Current Outpatient Medications:     amLODIPine-benazepriL (LOTREL) 10-40 mg per capsule, Take 1 capsule by mouth once daily., Disp: 90 capsule, Rfl: 3    ascorbic acid, vitamin C, (VITAMIN C) 1000 MG tablet, Take 1,000 mg by mouth once daily., Disp: , Rfl:     cilostazoL (PLETAL) 100 MG Tab, Take 100 mg by mouth 2 (two) times daily., Disp: , Rfl:     multivitamin capsule, Take 1 capsule by mouth once daily., Disp: , Rfl:     aspirin (ECOTRIN) 81 MG EC tablet, Take 81 mg by mouth once daily. (Patient not taking: Reported on 11/14/2024), Disp: , Rfl:     atorvastatin (LIPITOR) 20 MG tablet, Take 1 tablet (20 mg total) by mouth once daily., Disp: 90 tablet, Rfl: 3    tamsulosin (FLOMAX) 0.4 mg Cap, Take 1 capsule (0.4 mg total) by mouth 2 (two) times a day., Disp: 180 capsule, Rfl: 3    Review of Systems   Constitutional:  Negative for activity change, appetite change, chills, fatigue, fever and unexpected weight change.   HENT:  Negative for ear pain, hearing loss, rhinorrhea and trouble swallowing.    Eyes:  Negative for pain, discharge and visual disturbance.   Respiratory:  Negative for apnea, cough, chest tightness, shortness of breath and wheezing.    Cardiovascular:  Negative for chest pain, palpitations and leg swelling.   Gastrointestinal:  Negative for abdominal pain, blood in stool, constipation, diarrhea, nausea, vomiting and reflux.   Endocrine: Negative for cold intolerance, heat intolerance, polydipsia and polyuria.   Genitourinary:  Negative for bladder incontinence, difficulty urinating, dysuria, erectile dysfunction, frequency, hematuria, testicular pain and urgency.   Musculoskeletal:  Negative for arthralgias,  "gait problem, joint swelling, myalgias and neck pain.   Neurological:  Negative for dizziness, seizures, weakness, numbness and headaches.   Psychiatric/Behavioral:  Negative for agitation, behavioral problems, confusion, dysphoric mood and hallucinations. The patient is not nervous/anxious.            Objective:      Vitals:    11/14/24 1139   BP: 138/80   Pulse: 84   SpO2: 98%   Weight: 66.7 kg (147 lb)   Height: 5' 8.5" (1.74 m)     Physical Exam  Vitals and nursing note reviewed.   Constitutional:       General: He is not in acute distress.     Appearance: Normal appearance. He is well-developed. He is not toxic-appearing.   HENT:      Head: Normocephalic and atraumatic.      Right Ear: Tympanic membrane and external ear normal.      Left Ear: Tympanic membrane and external ear normal.      Nose: Nose normal.      Mouth/Throat:      Pharynx: Oropharynx is clear. No posterior oropharyngeal erythema.   Eyes:      Pupils: Pupils are equal, round, and reactive to light.   Neck:      Thyroid: No thyromegaly.      Vascular: No carotid bruit.      Comments: Left neck has a carotid endarterectomy scar well healed  Cardiovascular:      Rate and Rhythm: Normal rate and regular rhythm.      Heart sounds: Normal heart sounds. No murmur heard.  Pulmonary:      Effort: Pulmonary effort is normal.      Breath sounds: Normal breath sounds. No wheezing or rales.   Abdominal:      General: Bowel sounds are normal. There is no distension.      Palpations: Abdomen is soft.      Tenderness: There is no abdominal tenderness.   Musculoskeletal:         General: No tenderness or deformity. Normal range of motion.      Cervical back: Normal range of motion and neck supple.      Lumbar back: Normal. No spasms.      Comments: Bends 90 degrees at  waist, shoulders and knees have full range of motion, no pitting edema to lower extremities   Lymphadenopathy:      Cervical: No cervical adenopathy.   Skin:     General: Skin is warm and dry.    "   Findings: No rash.   Neurological:      General: No focal deficit present.      Mental Status: He is alert and oriented to person, place, and time. Mental status is at baseline.      Cranial Nerves: No cranial nerve deficit.      Coordination: Coordination normal.   Psychiatric:         Mood and Affect: Mood normal.         Behavior: Behavior normal.         Thought Content: Thought content normal.         Judgment: Judgment normal.           Assessment:       1. Right-sided extracranial carotid artery stenosis    2. Benign prostatic hyperplasia with lower urinary tract symptoms, symptom details unspecified    3. Mixed hyperlipidemia    4. Stenosis of right vertebral artery    5. Primary hypertension    6. Left carotid stenosis    7. Claudication    8. Anemia, unspecified type    9. Smoker         Plan:       Right-sided extracranial carotid artery stenosis  Continue follow-up yearly with Dr. Frank Rey for ultrasound of the carotids.  Benign prostatic hyperplasia with lower urinary tract symptoms, symptom details unspecified  -     tamsulosin (FLOMAX) 0.4 mg Cap; Take 1 capsule (0.4 mg total) by mouth 2 (two) times a day.  Dispense: 180 capsule; Refill: 3  Take tamsulosin twice a day  Mixed hyperlipidemia  -     atorvastatin (LIPITOR) 20 MG tablet; Take 1 tablet (20 mg total) by mouth once daily.  Dispense: 90 tablet; Refill: 3  Cholesterol 212 TG 73  increase atorvastatin 20 mg daily  Stenosis of right vertebral artery    Primary hypertension  Blood pressure well controlled  Left carotid stenosis  Status post endarterectomy, now wide open per ultrasound  Claudication  Continue cilostazol 100 mg b.i.d.  Anemia, unspecified type  Hemoglobin 13.3 hematocrit 39.9  Smoker  Encourage smoking reduction and cessation    Follow up in about 6 months (around 5/14/2025), or bph.        11/16/2024 Neri Blue

## 2025-02-24 DIAGNOSIS — Z00.00 ENCOUNTER FOR MEDICARE ANNUAL WELLNESS EXAM: ICD-10-CM

## 2025-05-15 ENCOUNTER — TELEPHONE (OUTPATIENT)
Dept: FAMILY MEDICINE | Facility: CLINIC | Age: 70
End: 2025-05-15
Payer: MEDICARE

## 2025-05-20 ENCOUNTER — TELEPHONE (OUTPATIENT)
Dept: FAMILY MEDICINE | Facility: CLINIC | Age: 70
End: 2025-05-20
Payer: MEDICARE

## 2025-05-20 NOTE — TELEPHONE ENCOUNTER
----- Message from Brea sent at 5/20/2025  3:57 PM CDT -----  Patient would like to know if he need to fast. [phone]

## 2025-05-20 NOTE — TELEPHONE ENCOUNTER
Spoke with patient, he wanted to know how he is suppose to come in fasting for his appointment when it's at 3pm. I explained that we sent that message so he could have it done prior to his appointment. He just saw the message yesterday and did not have time. He is going to come in on Thursday and have them done fasting and we will call him with the results.

## 2025-07-22 ENCOUNTER — TELEPHONE (OUTPATIENT)
Dept: ADMINISTRATIVE | Facility: CLINIC | Age: 70
End: 2025-07-22
Payer: MEDICARE

## 2025-07-22 NOTE — TELEPHONE ENCOUNTER
Called pt; informed pt I was calling to make sure pt's 7/29/25 home visit for pt's eawv appt still worked; pt stated as of now the appt still works; informed pt the provider would call 24-48 hours before the scheduled appt date to confirm appt time; pt verbalized understanding

## 2025-07-28 ENCOUNTER — OFFICE VISIT (OUTPATIENT)
Dept: FAMILY MEDICINE | Facility: CLINIC | Age: 70
End: 2025-07-28
Payer: MEDICARE

## 2025-07-28 VITALS
HEIGHT: 69 IN | DIASTOLIC BLOOD PRESSURE: 60 MMHG | OXYGEN SATURATION: 98 % | WEIGHT: 151.38 LBS | SYSTOLIC BLOOD PRESSURE: 134 MMHG | BODY MASS INDEX: 22.42 KG/M2 | HEART RATE: 96 BPM

## 2025-07-28 DIAGNOSIS — I65.22 LEFT CAROTID STENOSIS: ICD-10-CM

## 2025-07-28 DIAGNOSIS — I73.9 CLAUDICATION: ICD-10-CM

## 2025-07-28 DIAGNOSIS — I65.21 RIGHT-SIDED EXTRACRANIAL CAROTID ARTERY STENOSIS: Primary | ICD-10-CM

## 2025-07-28 DIAGNOSIS — R35.1 BENIGN PROSTATIC HYPERPLASIA WITH NOCTURIA: ICD-10-CM

## 2025-07-28 DIAGNOSIS — Z12.5 SPECIAL SCREENING FOR MALIGNANT NEOPLASM OF PROSTATE: ICD-10-CM

## 2025-07-28 DIAGNOSIS — N40.1 BENIGN PROSTATIC HYPERPLASIA WITH NOCTURIA: ICD-10-CM

## 2025-07-28 DIAGNOSIS — N18.32 STAGE 3B CHRONIC KIDNEY DISEASE: ICD-10-CM

## 2025-07-28 DIAGNOSIS — D64.9 ANEMIA, UNSPECIFIED TYPE: ICD-10-CM

## 2025-07-28 DIAGNOSIS — I65.01 STENOSIS OF RIGHT VERTEBRAL ARTERY: ICD-10-CM

## 2025-07-28 DIAGNOSIS — N40.1 BENIGN PROSTATIC HYPERPLASIA WITH LOWER URINARY TRACT SYMPTOMS, SYMPTOM DETAILS UNSPECIFIED: ICD-10-CM

## 2025-07-28 DIAGNOSIS — E78.2 MIXED HYPERLIPIDEMIA: ICD-10-CM

## 2025-07-28 DIAGNOSIS — F17.200 SMOKER: ICD-10-CM

## 2025-07-28 DIAGNOSIS — I10 PRIMARY HYPERTENSION: ICD-10-CM

## 2025-07-28 PROCEDURE — 3075F SYST BP GE 130 - 139MM HG: CPT | Mod: CPTII,S$GLB,, | Performed by: FAMILY MEDICINE

## 2025-07-28 PROCEDURE — 1126F AMNT PAIN NOTED NONE PRSNT: CPT | Mod: CPTII,S$GLB,, | Performed by: FAMILY MEDICINE

## 2025-07-28 PROCEDURE — 3078F DIAST BP <80 MM HG: CPT | Mod: CPTII,S$GLB,, | Performed by: FAMILY MEDICINE

## 2025-07-28 PROCEDURE — 3008F BODY MASS INDEX DOCD: CPT | Mod: CPTII,S$GLB,, | Performed by: FAMILY MEDICINE

## 2025-07-28 PROCEDURE — 4010F ACE/ARB THERAPY RXD/TAKEN: CPT | Mod: CPTII,S$GLB,, | Performed by: FAMILY MEDICINE

## 2025-07-28 PROCEDURE — 1159F MED LIST DOCD IN RCRD: CPT | Mod: CPTII,S$GLB,, | Performed by: FAMILY MEDICINE

## 2025-07-28 PROCEDURE — 1101F PT FALLS ASSESS-DOCD LE1/YR: CPT | Mod: CPTII,S$GLB,, | Performed by: FAMILY MEDICINE

## 2025-07-28 PROCEDURE — 3288F FALL RISK ASSESSMENT DOCD: CPT | Mod: CPTII,S$GLB,, | Performed by: FAMILY MEDICINE

## 2025-07-28 PROCEDURE — 99214 OFFICE O/P EST MOD 30 MIN: CPT | Mod: S$GLB,,, | Performed by: FAMILY MEDICINE

## 2025-07-28 RX ORDER — CILOSTAZOL 100 MG/1
100 TABLET ORAL 2 TIMES DAILY
Qty: 180 TABLET | Refills: 3 | Status: SHIPPED | OUTPATIENT
Start: 2025-07-28

## 2025-07-28 RX ORDER — ATORVASTATIN CALCIUM 20 MG/1
20 TABLET, FILM COATED ORAL DAILY
Qty: 90 TABLET | Refills: 3 | Status: SHIPPED | OUTPATIENT
Start: 2025-07-28 | End: 2026-07-28

## 2025-07-28 RX ORDER — TAMSULOSIN HYDROCHLORIDE 0.4 MG/1
0.8 CAPSULE ORAL 2 TIMES DAILY
Qty: 180 CAPSULE | Refills: 3 | Status: SHIPPED | OUTPATIENT
Start: 2025-07-28

## 2025-07-28 RX ORDER — AMLODIPINE AND BENAZEPRIL HYDROCHLORIDE 10; 40 MG/1; MG/1
1 CAPSULE ORAL DAILY
Qty: 90 CAPSULE | Refills: 3 | Status: SHIPPED | OUTPATIENT
Start: 2025-07-28

## 2025-07-28 NOTE — PROGRESS NOTES
SUBJECTIVE:    Patient ID: Koffi Merritt is a 70 y.o. male.    Chief Complaint: Follow-up (No bottles// Pt is here for a follow up and medication refills// KMS)    Follow-up  Pertinent negatives include no arthralgias, chest pain, headaches, joint swelling, neck pain, vomiting or weakness.       History of Present Illness    CHIEF COMPLAINT:  Koffi presents today for follow up.    CARDIOVASCULAR:  He denies chest pain and shortness of breath. He recently underwent an artery procedure performed by Dr. Rey and had follow-up last week. Results are pending and expected within 1-2 weeks.    SOCIAL HISTORY:  He has been smoking since his teenage years and currently smokes approximately 10 cigarettes daily. He denies experiencing any respiratory symptoms related to smoking.    ACTIVITY AND EXERCISE:  He maintains ability to perform household activities including lawn mowing, which he does in the evening to avoid peak heat. He reports improvement in previous calf muscle issues and currently experiences no significant activity limitations.    MEDICATIONS:  He continues Cilostazol for circulation, Amlodipine for blood pressure (needs refill), and Atorvastatin 20mg for cholesterol management which he is tolerating well. He has discontinued baby aspirin.    DIET AND HYDRATION:  He eats three meals daily but reports minimal water intake, approximately one cup per day.      ROS:  Constitutional: -appetite change, -chills, -fatigue, -fever, -unexpected weight change  HENT: -ear pain, -trouble swallowing  Eyes: -pain, -discharge, -visual disturbance  Respiratory: -apnea, -cough, -shortness of breath, -wheezing  Cardiovascular: -chest pain, -leg swelling  Gastrointestinal: -abdominal pain, -blood in stool, -constipation, -diarrhea, -nausea, -vomiting, -reflux  Endocrine: -cold intolerance, -heat intolerance, -polydipsia  Genitourinary: -bladder incontinence, -dysuria, -erectile dysfunction, -frequency, -hematuria, -testicular  pain, -urgency, -nocturia, +excessive urination  Musculoskeletal: -gait problem, -joint swelling, -myalgia  Neurological: -dizziness, -seizures, -numbness  Psychiatric/Behavioral: -agitation, -hallucinations, -nervous, -anxiety symptoms         No visits with results within 6 Month(s) from this visit.   Latest known visit with results is:   Office Visit on 11/14/2024   Component Date Value Ref Range Status    WBC 07/22/2025 10.4  3.8 - 10.8 Thousand/uL Final    RBC 07/22/2025 3.80 (L)  4.20 - 5.80 Million/uL Final    Hemoglobin 07/22/2025 11.4 (L)  13.2 - 17.1 g/dL Final    Hematocrit 07/22/2025 35.3 (L)  38.5 - 50.0 % Final    MCV 07/22/2025 92.9  80.0 - 100.0 fL Final    MCH 07/22/2025 30.0  27.0 - 33.0 pg Final    MCHC 07/22/2025 32.3  32.0 - 36.0 g/dL Final    RDW 07/22/2025 15.2 (H)  11.0 - 15.0 % Final    Platelets 07/22/2025 305  140 - 400 Thousand/uL Final    MPV 07/22/2025 10.5  7.5 - 12.5 fL Final    Neutrophils, Abs 07/22/2025 4,982  1,500 - 7,800 cells/uL Final    Lymph # 07/22/2025 3,401  850 - 3,900 cells/uL Final    Mono # 07/22/2025 988 (H)  200 - 950 cells/uL Final    Eos # 07/22/2025 884 (H)  15 - 500 cells/uL Final    Baso # 07/22/2025 146  0 - 200 cells/uL Final    Neutrophils Relative 07/22/2025 47.9  % Final    Lymph % 07/22/2025 32.7  % Final    Mono % 07/22/2025 9.5  % Final    Eosinophil % 07/22/2025 8.5  % Final    Basophil % 07/22/2025 1.4  % Final    Glucose 07/22/2025 105 (H)  65 - 99 mg/dL Final    BUN 07/22/2025 21  7 - 25 mg/dL Final    Creatinine 07/22/2025 1.99 (H)  0.70 - 1.28 mg/dL Final    eGFR 07/22/2025 35 (L)  > OR = 60 mL/min/1.73m2 Final    BUN/Creatinine Ratio 07/22/2025 11  6 - 22 (calc) Final    Sodium 07/22/2025 140  135 - 146 mmol/L Final    Potassium 07/22/2025 5.2  3.5 - 5.3 mmol/L Final    Chloride 07/22/2025 109  98 - 110 mmol/L Final    CO2 07/22/2025 26  20 - 32 mmol/L Final    Calcium 07/22/2025 9.4  8.6 - 10.3 mg/dL Final    Total Protein 07/22/2025 6.4  6.1 -  8.1 g/dL Final    Albumin 2025 3.7  3.6 - 5.1 g/dL Final    Globulin, Total 2025 2.7  1.9 - 3.7 g/dL (calc) Final    Albumin/Globulin Ratio 2025 1.4  1.0 - 2.5 (calc) Final    Total Bilirubin 2025 0.3  0.2 - 1.2 mg/dL Final    Alkaline Phosphatase 2025 64  35 - 144 U/L Final    AST 2025 19  10 - 35 U/L Final    ALT 2025 12  9 - 46 U/L Final    Cholesterol 2025 184  <200 mg/dL Final    HDL 2025 91  > OR = 40 mg/dL Final    Triglycerides 2025 63  <150 mg/dL Final    LDL Cholesterol 2025 79  mg/dL (calc) Final    HDL/Cholesterol Ratio 2025 2.0  <5.0 (calc) Final    Non HDL Chol. (LDL+VLDL) 2025 93  <130 mg/dL (calc) Final    TSH 2025 1.39  0.40 - 4.50 mIU/L Final       Past Medical History:   Diagnosis Date    Carotid stenosis, left     Hypertension      Social History[1]  Past Surgical History:   Procedure Laterality Date    CAROTID ENDARTERECTOMY Left 2023    Procedure: ENDARTERECTOMY, CAROTID;  Surgeon: Frank Rey MD;  Location: [facility];  Service: General;  Laterality: Left;  EEG!     Family History   Problem Relation Name Age of Onset    Diabetes Mother alejandra thomas     Kidney failure Mother alejandra thomas          at 82    Heart failure Father           in the late 50's.     Coronary artery disease Brother          stent placed, DM II, alive at 70       The CVD Risk score (D'Agostino, et al., 2008) failed to calculate for the following reasons:    The patient has a prior MI, stroke, CHF, or peripheral vascular disease diagnosis    All of your core healthy metrics are met.      Review of patient's allergies indicates:  No Known Allergies  Current Medications[2]    Review of Systems   Constitutional:  Negative for activity change and unexpected weight change.   HENT:  Negative for hearing loss, rhinorrhea and trouble swallowing.    Eyes:  Negative for discharge and visual disturbance.   Respiratory:  Negative for chest  "tightness and wheezing.    Cardiovascular:  Negative for chest pain and palpitations.   Gastrointestinal:  Negative for blood in stool, constipation, diarrhea and vomiting.   Endocrine: Negative for polydipsia and polyuria.   Genitourinary:  Negative for difficulty urinating, hematuria and urgency.   Musculoskeletal:  Negative for arthralgias, joint swelling and neck pain.   Neurological:  Negative for weakness and headaches.   Psychiatric/Behavioral:  Negative for confusion and dysphoric mood.            Objective:      Vitals:    07/28/25 0838   BP: 134/60   Pulse: 96   SpO2: 98%   Weight: 68.7 kg (151 lb 6.4 oz)   Height: 5' 8.5" (1.74 m)     Physical Exam  Vitals and nursing note reviewed.   Constitutional:       General: He is not in acute distress.     Appearance: Normal appearance. He is well-developed. He is not toxic-appearing.   HENT:      Head: Normocephalic and atraumatic.      Right Ear: Tympanic membrane and external ear normal.      Left Ear: Tympanic membrane and external ear normal.      Nose: Nose normal.      Mouth/Throat:      Pharynx: Oropharynx is clear. No posterior oropharyngeal erythema.   Eyes:      Pupils: Pupils are equal, round, and reactive to light.   Neck:      Thyroid: No thyromegaly.      Vascular: Carotid bruit (Right carotid Bruit is present, left carotid endarterectomy scar present in the left neck.) present.   Cardiovascular:      Rate and Rhythm: Normal rate and regular rhythm.      Heart sounds: Normal heart sounds. No murmur heard.  Pulmonary:      Effort: Pulmonary effort is normal.      Breath sounds: Normal breath sounds. No wheezing or rales.   Abdominal:      General: Bowel sounds are normal. There is no distension.      Palpations: Abdomen is soft.      Tenderness: There is no abdominal tenderness.   Musculoskeletal:         General: No tenderness or deformity. Normal range of motion.      Cervical back: Normal range of motion and neck supple.      Lumbar back: Normal. " No spasms.      Comments: Bends 90 degrees at  waist, shoulders and knees have full range of motion, no pitting edema to lower extremities   Lymphadenopathy:      Cervical: No cervical adenopathy.   Skin:     General: Skin is warm and dry.      Findings: No rash.   Neurological:      General: No focal deficit present.      Mental Status: He is alert and oriented to person, place, and time. Mental status is at baseline.      Cranial Nerves: No cranial nerve deficit.      Coordination: Coordination normal.      Gait: Gait normal.   Psychiatric:         Mood and Affect: Mood normal.         Behavior: Behavior normal.         Thought Content: Thought content normal.         Judgment: Judgment normal.       Physical Exam    Cardiovascular: Whooshing noise heard in carotid artery.  Vitals: Normal blood pressure.             Assessment:       1. Right-sided extracranial carotid artery stenosis    2. Stenosis of right vertebral artery    3. Claudication    4. Left carotid stenosis    5. Mixed hyperlipidemia    6. Primary hypertension    7. Anemia, unspecified type    8. Benign prostatic hyperplasia with nocturia    9. Smoker    10. Stage 3b chronic kidney disease    11. Benign prostatic hyperplasia with lower urinary tract symptoms, symptom details unspecified    12. Special screening for malignant neoplasm of prostate         Plan:       Right-sided extracranial carotid artery stenosis  Followed by Dr. Frank Rey, had carotid ultrasound done last week, results are pending.  2024 he had 50-69 right carotid stenosis present  Stenosis of right vertebral artery    Claudication  Doing well with cilostazol  Left carotid stenosis  Status post CEA  Mixed hyperlipidemia  Cholesterol 184 HDL 91 TG 63 LDL 79, much improved with the atorvastatin 20 mg  Primary hypertension  Blood pressure well controlled  Anemia, unspecified type  Hemoglobin 11 hematocrit 35 normochromic normocytic indices  Benign prostatic hyperplasia with  nocturia  PSA was normal  Smoker  Encourage patient to reduce the amount of cigarettes smoked or consider cessation  Stage 3b chronic kidney disease  BUN 21 creatinine 1.99 GFR dropped from 41 down to 35.  Encourage patient to drink an extra water bottle daily    Assessment & Plan    N18.32 Stage 3b chronic kidney disease  I65.29 Occlusion and stenosis of unspecified carotid artery  I73.9 Peripheral vascular disease, unspecified  I10 Essential (primary) hypertension  E78.5 Hyperlipidemia, unspecified  N40.1 Benign prostatic hyperplasia with lower urinary tract symptoms  F17.210 Nicotine dependence, cigarettes, uncomplicated  E86.0 Dehydration  D64.9 Anemia, unspecified    STAGE 3B CHRONIC KIDNEY DISEASE:  - Monitored kidney function, which shows signs of mild dehydration despite being adequate overall.  - Educated patient on the importance of increased water intake to improve renal function and advised adding at least 1 additional water bottle daily.    CAROTID ARTERY STENOSIS:  - Reviewed last year's ultrasound results showing mild to moderate stenosis (50-69% blockage).  - Dr. Rey is monitoring and waiting for the stenosis to reach 70-75% before considering surgical intervention.  - Explained the benefits of reducing cigarette use to prevent further arterial narrowing.    PERIPHERAL ARTERY DISEASE:  - Calf symptoms have improved from previous visits.  - Cilostazol has been effective, allowing patient to walk 1-2 blocks without significant claudication.  - Will continue this medication for peripheral artery disease management.    HYPERTENSION:  - Blood pressure is well-controlled today.  - Prescribed refill of amlodipine for continued management.    HYPERLIPIDEMIA:  - Cholesterol levels have significantly improved from 212 to 184, with elevated HDL and reduced LDL.  - Will continue atorvastatin 20 mg daily.    BENIGN PROSTATIC HYPERPLASIA:  - Prescribed refill of prostate medication for continued management of  lower urinary tract symptoms.    NICOTINE DEPENDENCE:  - Current tobacco use at approximately 10 cigarettes per day (half-pack).  - Recommend reduction in cigarette consumption to prevent further health complications, particularly related to vascular disease.    ANEMIA:  - Red blood count is at the borderline of anemia without apparent iron deficiency or active bleeding.  - Will continue to monitor.    CARDIOVASCULAR HEALTH:  - Stable with no reported chest pain or shortness of breath.    PREVENTIVE CARE:  - Koffi declined pneumonia vaccination.    FOLLOW-UP:  - Return in 6 months.         Follow up in about 6 months (around 1/28/2026), or htn carotid  stenosis.        This note was generated with the assistance of ambient listening technology. Verbal consent was obtained by the patient and accompanying visitor(s) for the recording of patient appointment to facilitate this note. I attest to having reviewed and edited the generated note for accuracy, though some syntax or spelling errors may persist. Please contact the author of this note for any clarification.      7/28/2025 Neri Blue           [1]   Social History  Socioeconomic History    Marital status:     Number of children: 5   Occupational History    Occupation: Retired , worked at [employer] in [employer]ing    Tobacco Use    Smoking status: Every Day     Current packs/day: 0.50     Average packs/day: 0.5 packs/day for 93.0 years (46.5 ttl pk-yrs)     Types: Cigarettes     Start date: 7/16/1972    Smokeless tobacco: Never   Substance and Sexual Activity    Alcohol use: Yes     Alcohol/week: 3.0 standard drinks of alcohol     Types: 3 Cans of beer per week     Comment: nightly     Drug use: Never    Sexual activity: Yes     Partners: Female     Birth control/protection: None     Social Drivers of Health     Financial Resource Strain: Medium Risk (7/21/2025)    Overall Financial Resource Strain (CARDIA)     Difficulty of Paying Living Expenses:  Somewhat hard   Food Insecurity: Food Insecurity Present (7/21/2025)    Hunger Vital Sign     Worried About Running Out of Food in the Last Year: Sometimes true     Ran Out of Food in the Last Year: Never true   Transportation Needs: No Transportation Needs (7/21/2025)    PRAPARE - Transportation     Lack of Transportation (Medical): No     Lack of Transportation (Non-Medical): No   Physical Activity: Insufficiently Active (7/21/2025)    Exercise Vital Sign     Days of Exercise per Week: 4 days     Minutes of Exercise per Session: 30 min   Stress: No Stress Concern Present (7/21/2025)    [employer] - Occupational Stress Questionnaire     Feeling of Stress : Not at all   Housing Stability: Low Risk  (7/21/2025)    Housing Stability Vital Sign     Unable to Pay for Housing in the Last Year: No     Number of Times Moved in the Last Year: 0     Homeless in the Last Year: No   [2]   Current Outpatient Medications:     amLODIPine-benazepriL (LOTREL) 10-40 mg per capsule, Take 1 capsule by mouth once daily., Disp: 90 capsule, Rfl: 3    ascorbic acid, vitamin C, (VITAMIN C) 1000 MG tablet, Take 1,000 mg by mouth once daily., Disp: , Rfl:     aspirin (ECOTRIN) 81 MG EC tablet, Take 81 mg by mouth once daily. (Patient not taking: Reported on 11/14/2024), Disp: , Rfl:     atorvastatin (LIPITOR) 20 MG tablet, Take 1 tablet (20 mg total) by mouth once daily., Disp: 90 tablet, Rfl: 3    cilostazoL (PLETAL) 100 MG Tab, Take 1 tablet (100 mg total) by mouth 2 (two) times daily., Disp: 180 tablet, Rfl: 3    multivitamin capsule, Take 1 capsule by mouth once daily., Disp: , Rfl:     tamsulosin (FLOMAX) 0.4 mg Cap, Take 2 capsules (0.8 mg total) by mouth 2 (two) times a day., Disp: 180 capsule, Rfl: 3

## 2025-07-29 ENCOUNTER — OFFICE VISIT (OUTPATIENT)
Dept: HOME HEALTH SERVICES | Facility: CLINIC | Age: 70
End: 2025-07-29
Payer: MEDICARE

## 2025-07-29 VITALS
BODY MASS INDEX: 22.36 KG/M2 | OXYGEN SATURATION: 99 % | DIASTOLIC BLOOD PRESSURE: 67 MMHG | HEART RATE: 80 BPM | WEIGHT: 151 LBS | HEIGHT: 69 IN | SYSTOLIC BLOOD PRESSURE: 139 MMHG

## 2025-07-29 DIAGNOSIS — I10 PRIMARY HYPERTENSION: ICD-10-CM

## 2025-07-29 DIAGNOSIS — Z00.00 ENCOUNTER FOR MEDICARE ANNUAL WELLNESS EXAM: Primary | ICD-10-CM

## 2025-07-29 DIAGNOSIS — F17.200 SMOKER: ICD-10-CM

## 2025-07-29 DIAGNOSIS — I65.01 STENOSIS OF RIGHT VERTEBRAL ARTERY: ICD-10-CM

## 2025-07-29 DIAGNOSIS — E78.2 MIXED HYPERLIPIDEMIA: ICD-10-CM

## 2025-07-29 DIAGNOSIS — N40.1 BENIGN PROSTATIC HYPERPLASIA WITH NOCTURIA: ICD-10-CM

## 2025-07-29 DIAGNOSIS — R35.1 BENIGN PROSTATIC HYPERPLASIA WITH NOCTURIA: ICD-10-CM

## 2025-07-29 DIAGNOSIS — D64.9 ANEMIA, UNSPECIFIED TYPE: ICD-10-CM

## 2025-07-29 PROCEDURE — 3075F SYST BP GE 130 - 139MM HG: CPT | Mod: CPTII,S$GLB,, | Performed by: NURSE PRACTITIONER

## 2025-07-29 PROCEDURE — G0439 PPPS, SUBSEQ VISIT: HCPCS | Mod: S$GLB,,, | Performed by: NURSE PRACTITIONER

## 2025-07-29 PROCEDURE — 1126F AMNT PAIN NOTED NONE PRSNT: CPT | Mod: CPTII,S$GLB,, | Performed by: NURSE PRACTITIONER

## 2025-07-29 PROCEDURE — 1160F RVW MEDS BY RX/DR IN RCRD: CPT | Mod: CPTII,S$GLB,, | Performed by: NURSE PRACTITIONER

## 2025-07-29 PROCEDURE — 1159F MED LIST DOCD IN RCRD: CPT | Mod: CPTII,S$GLB,, | Performed by: NURSE PRACTITIONER

## 2025-07-29 PROCEDURE — 3288F FALL RISK ASSESSMENT DOCD: CPT | Mod: CPTII,S$GLB,, | Performed by: NURSE PRACTITIONER

## 2025-07-29 PROCEDURE — 1158F ADVNC CARE PLAN TLK DOCD: CPT | Mod: CPTII,S$GLB,, | Performed by: NURSE PRACTITIONER

## 2025-07-29 PROCEDURE — 4010F ACE/ARB THERAPY RXD/TAKEN: CPT | Mod: CPTII,S$GLB,, | Performed by: NURSE PRACTITIONER

## 2025-07-29 PROCEDURE — 3078F DIAST BP <80 MM HG: CPT | Mod: CPTII,S$GLB,, | Performed by: NURSE PRACTITIONER

## 2025-07-29 PROCEDURE — 1101F PT FALLS ASSESS-DOCD LE1/YR: CPT | Mod: CPTII,S$GLB,, | Performed by: NURSE PRACTITIONER

## 2025-08-04 NOTE — PATIENT INSTRUCTIONS
Counseling and Referral of Other Preventative  (Italic type indicates deductible and co-insurance are waived)    Patient Name: Koffi Merritt  Today's Date: 8/4/2025    Health Maintenance       Date Due Completion Date    Hepatitis C Screening Never done ---    TETANUS VACCINE Never done ---    LDCT Lung Screen Never done ---    Shingles Vaccine (1 of 2) Never done ---    RSV Vaccine (Age 60+ and Pregnant patients) (1 - Risk 60-74 years 1-dose series) Never done ---    Pneumococcal Vaccines (Age 50+) (2 of 2 - PPSV23) 06/18/2018 4/23/2018    Aspirin/Antiplatelet Therapy 05/22/2024 5/22/2023    COVID-19 Vaccine (4 - 2024-25 season) 09/01/2024 12/20/2021    Influenza Vaccine (1) 09/01/2025 ---    Annual UACr 11/07/2025 11/7/2024    High Dose Statin 07/28/2026 7/28/2025    Lipid Panel 07/22/2030 7/22/2025    Colorectal Cancer Screening 01/11/2032 1/11/2022        No orders of the defined types were placed in this encounter.      The following information is provided to all patients.  This information is to help you find resources for any of the problems found today that may be affecting your health:                  Living healthy guide: www.[facility].[place].gov      Understanding Diabetes: www.diabetes.org      Eating healthy: www.cdc.gov/healthyweight      CDC home safety checklist: www.cdc.gov/steadi/patient.html      Agency on Aging: www.goea.[place].gov      Alcoholics anonymous (AA): www.aa.org      Physical Activity: www.damon.nih.gov/zi2ufzf      Tobacco use: www.quitwithusla.org

## 2025-08-04 NOTE — PROGRESS NOTES
"  Koffi Merritt presented for an initial Medicare AWV today. The following components were reviewed and updated:    Medical history  Family History  Social history  Allergies and Current Medications  Health Risk Assessment  Health Maintenance  Care Team    **See Completed Assessments for Annual Wellness visit with in the encounter summary    The following assessments were completed:  Depression Screening  Cognitive function Screening  Timed Get Up Test  Whisper Test      Opioid documentation:      Patient does not have a current opioid prescription.          Vitals:    07/29/25 1118 07/29/25 1125   BP: (!) 150/70 139/67   Patient Position: Sitting    Pulse: 80    SpO2: 99%    Weight: 68.5 kg (151 lb)    Height: 5' 8.5" (1.74 m)      Body mass index is 22.63 kg/m².       Physical Exam  Constitutional:       Appearance: Normal appearance.   HENT:      Head: Normocephalic and atraumatic.      Nose: Nose normal.      Mouth/Throat:      Mouth: Mucous membranes are moist.   Eyes:      Extraocular Movements: Extraocular movements intact.      Pupils: Pupils are equal, round, and reactive to light.   Cardiovascular:      Rate and Rhythm: Normal rate and regular rhythm.   Pulmonary:      Effort: Pulmonary effort is normal.      Breath sounds: Normal breath sounds.   Abdominal:      General: Bowel sounds are normal.      Palpations: Abdomen is soft.   Musculoskeletal:      Cervical back: Normal range of motion and neck supple.   Skin:     General: Skin is warm and dry.   Neurological:      General: No focal deficit present.      Mental Status: He is alert and oriented to person, place, and time.   Psychiatric:         Mood and Affect: Mood normal.         Behavior: Behavior normal.           Diagnoses and health risks identified today and associated recommendations/orders:  1. Encounter for Medicare annual wellness exam    - Referral to Enhanced Annual Wellness Visit (eAWV) W+1    2. Mixed hyperlipidemia  On lipitor - " stable      3. Primary hypertension  On amlodopine- stable    4. Benign prostatic hyperplasia with nocturia  On Flomax - stable     5. Stenosis of right vertebral artery  On asa and statin       6. Anemia, unspecified type  Followed with labs     7. Smoker  Smoking cessation - discussed      Provided Koffi with a 5-10 year written screening schedule and personal prevention plan. Recommendations were developed using the USPSTF age appropriate recommendations. Education, counseling, and referrals were provided as needed.  After Visit Summary printed and given to patient which includes a list of additional screenings\tests needed.    Fu in 1 yr for jay Malik, DNP, APRN

## (undated) DEVICE — DRAPE T THYROID W/ARMBOARD COVER

## (undated) DEVICE — TRAY SKIN PREP DRY

## (undated) DEVICE — DRAPE IOBAN 13X13 6640EZ

## (undated) DEVICE — BAG DECANTER 10-102

## (undated) DEVICE — SUTURE MONOCRYL 4-0 PS-2 27 MCP426H

## (undated) DEVICE — GLOVE BIOGEL MICRO SURGEON PINK SZ 7.5

## (undated) DEVICE — SOLUTION DURAPREP 8630

## (undated) DEVICE — DRAPE MAGNETIC 16X20 Q420

## (undated) DEVICE — SOLUTION IRRI NS BOTTLE 1000ML R5200-01

## (undated) DEVICE — WINGSET SLBCS 23X.75 12LUER

## (undated) DEVICE — SUTURE PROLENE 6-0 BV-1 24 8805H

## (undated) DEVICE — SYRINGE SAFETY 1ML TB 27GA X 1/2 305789

## (undated) DEVICE — SUTURE VICRYL 3-0 18 SH VCP864D

## (undated) DEVICE — PAD BOVIE ADULT

## (undated) DEVICE — GOWN SMART LRG 044673

## (undated) DEVICE — CLIP APPLIER SM MCS20

## (undated) DEVICE — Device

## (undated) DEVICE — LOOP MAXI RED 30-722

## (undated) DEVICE — HEMOSTAT SURGICEL 4X8

## (undated) DEVICE — ADHESIVE TISSUE EXOFIN

## (undated) DEVICE — SUTURE PROLENE 7-0 BV175-6 24 8735H

## (undated) DEVICE — CLIP APPLIER MEDIUM MSM20

## (undated) DEVICE — SYRINGE 3ML

## (undated) DEVICE — TRAY VASCULAR SLIDELL MEMORIAL

## (undated) DEVICE — KIT SHUNT CAROTID ARGYLE 577775

## (undated) DEVICE — DRESSING POST OP MEPILEX AG 4X6  498300

## (undated) DEVICE — BLADE BEAVER #69  AVER6900

## (undated) DEVICE — LOOP MINI BLUE 30-713

## (undated) DEVICE — SOLUTION SCRUB IODINE 4OZ